# Patient Record
Sex: MALE | Race: BLACK OR AFRICAN AMERICAN | NOT HISPANIC OR LATINO | Employment: UNEMPLOYED | URBAN - METROPOLITAN AREA
[De-identification: names, ages, dates, MRNs, and addresses within clinical notes are randomized per-mention and may not be internally consistent; named-entity substitution may affect disease eponyms.]

---

## 2017-04-26 ENCOUNTER — HISTORICAL (OUTPATIENT)
Dept: ADMINISTRATIVE | Facility: HOSPITAL | Age: 32
End: 2017-04-26

## 2017-04-26 LAB
ALBUMIN SERPL-MCNC: 4.2 GM/DL (ref 3.4–5)
ALBUMIN/GLOB SERPL: 1 RATIO (ref 1–2)
ALP SERPL-CCNC: 31 UNIT/L (ref 20–120)
ALT SERPL-CCNC: 16 UNIT/L
AST SERPL-CCNC: 18 UNIT/L
BILIRUB SERPL-MCNC: 0.5 MG/DL
BILIRUBIN DIRECT+TOT PNL SERPL-MCNC: <0.1 MG/DL
BILIRUBIN DIRECT+TOT PNL SERPL-MCNC: >0.4 MG/DL
BUN SERPL-MCNC: 16 MG/DL (ref 7–25)
CALCIUM SERPL-MCNC: 9.5 MG/DL (ref 8.4–10.3)
CD3+CD4+ CELLS # SPEC: 714 UNIT/L (ref 589–1505)
CD3+CD4+ CELLS NFR BLD: 36 % (ref 31–59)
CHLORIDE SERPL-SCNC: 101 MMOL/L (ref 96–110)
CHOLEST SERPL-MCNC: 161 MG/DL
CHOLEST/HDLC SERPL: 2.6 {RATIO} (ref 0–5)
CO2 SERPL-SCNC: 29 MMOL/L (ref 24–32)
CREAT SERPL-MCNC: 0.85 MG/DL (ref 0.7–1.4)
DEPRECATED CALCIDIOL+CALCIFEROL SERPL-MC: 16.61 NG/ML (ref 30–80)
EST. AVERAGE GLUCOSE BLD GHB EST-MCNC: 108 MG/DL
GLOBULIN SER-MCNC: 3.5 GM/ML (ref 2.3–3.5)
GLUCOSE SERPL-MCNC: 96 MG/DL (ref 65–99)
HAV IGM SERPL QL IA: NONREACTIVE
HBA1C MFR BLD: 5.4 % (ref 4.7–5.6)
HBV CORE IGM SERPL QL IA: NONREACTIVE
HBV SURFACE AG SERPL QL IA: NEGATIVE
HCV AB SERPL QL IA: NONREACTIVE
HDLC SERPL-MCNC: 61 MG/DL
LDLC SERPL CALC-MCNC: 88 MG/DL (ref 0–130)
LYMPHOCYTES # BLD AUTO: 1872 UNIT/L (ref 1260–5520)
LYMPHOCYTES NFR LN MANUAL: 39 % (ref 28–48)
LYMPHOMA - T-CELL MARKERS SPEC-IMP: NORMAL
POTASSIUM SERPL-SCNC: 4 MMOL/L (ref 3.6–5.2)
PROT SERPL-MCNC: 7.7 GM/DL (ref 6–8)
RPR SER QL: NORMAL
SODIUM SERPL-SCNC: 138 MMOL/L (ref 135–146)
TRIGL SERPL-MCNC: 59 MG/DL
TSH SERPL-ACNC: 0.82 MIU/L (ref 0.5–5)
VLDLC SERPL CALC-MCNC: 12 MG/DL
WBC # BLD AUTO: 4800 /MM3 (ref 4500–11500)

## 2018-06-27 ENCOUNTER — HISTORICAL (OUTPATIENT)
Dept: ADMINISTRATIVE | Facility: HOSPITAL | Age: 33
End: 2018-06-27

## 2018-06-27 LAB
ABS NEUT (OLG): 3.95 X10(3)/MCL (ref 2.1–9.2)
ALBUMIN SERPL-MCNC: 4.3 GM/DL (ref 3.4–5)
ALBUMIN/GLOB SERPL: 1 RATIO (ref 1–2)
ALP SERPL-CCNC: 40 UNIT/L (ref 45–117)
ALT SERPL-CCNC: 22 UNIT/L (ref 12–78)
AST SERPL-CCNC: 11 UNIT/L (ref 15–37)
BASOPHILS # BLD AUTO: 0.04 X10(3)/MCL
BASOPHILS NFR BLD AUTO: 1 %
BILIRUB SERPL-MCNC: 0.2 MG/DL (ref 0.2–1)
BILIRUBIN DIRECT+TOT PNL SERPL-MCNC: <0.1 MG/DL
BILIRUBIN DIRECT+TOT PNL SERPL-MCNC: ABNORMAL MG/DL
BUN SERPL-MCNC: 17 MG/DL (ref 7–18)
CALCIUM SERPL-MCNC: 9.4 MG/DL (ref 8.5–10.1)
CD3+CD4+ CELLS # SPEC: 947 UNIT/L (ref 589–1505)
CD3+CD4+ CELLS NFR BLD: 34.6 % (ref 31–59)
CHLORIDE SERPL-SCNC: 102 MMOL/L (ref 98–107)
CHOLEST SERPL-MCNC: 156 MG/DL
CHOLEST/HDLC SERPL: 2.6 {RATIO} (ref 0–5)
CO2 SERPL-SCNC: 30 MMOL/L (ref 21–32)
CREAT SERPL-MCNC: 0.9 MG/DL (ref 0.6–1.3)
DEPRECATED CALCIDIOL+CALCIFEROL SERPL-MC: 9.75 NG/ML (ref 30–80)
EOSINOPHIL # BLD AUTO: 0.07 X10(3)/MCL
EOSINOPHIL NFR BLD AUTO: 1 %
ERYTHROCYTE [DISTWIDTH] IN BLOOD BY AUTOMATED COUNT: 12.9 % (ref 11.5–14.5)
GLOBULIN SER-MCNC: 4 GM/ML (ref 2.3–3.5)
GLUCOSE SERPL-MCNC: 87 MG/DL (ref 74–106)
HAV IGM SERPL QL IA: NONREACTIVE
HBV CORE IGM SERPL QL IA: NONREACTIVE
HBV SURFACE AG SERPL QL IA: NEGATIVE
HCT VFR BLD AUTO: 41.5 % (ref 40–51)
HCV AB SERPL QL IA: NONREACTIVE
HDLC SERPL-MCNC: 61 MG/DL
HGB BLD-MCNC: 13.9 GM/DL (ref 13.5–17.5)
IMM GRANULOCYTES # BLD AUTO: 0.01 10*3/UL
IMM GRANULOCYTES NFR BLD AUTO: 0 %
LDLC SERPL CALC-MCNC: 81 MG/DL (ref 0–130)
LYMPHOCYTES # BLD AUTO: 2.7 X10(3)/MCL
LYMPHOCYTES # BLD AUTO: 2736 UNIT/L (ref 1260–5520)
LYMPHOCYTES NFR BLD AUTO: 38 % (ref 13–40)
LYMPHOCYTES NFR LN MANUAL: 38 % (ref 28–48)
LYMPHOMA - T-CELL MARKERS SPEC-IMP: NORMAL
MCH RBC QN AUTO: 30.6 PG (ref 26–34)
MCHC RBC AUTO-ENTMCNC: 33.5 GM/DL (ref 31–37)
MCV RBC AUTO: 91.4 FL (ref 80–100)
MONOCYTES # BLD AUTO: 0.42 X10(3)/MCL
MONOCYTES NFR BLD AUTO: 6 % (ref 4–12)
NEG CONT SPOT COUNT: NORMAL
NEUTROPHILS # BLD AUTO: 3.95 X10(3)/MCL
NEUTROPHILS NFR BLD AUTO: 55 X10(3)/MCL
PANEL A SPOT COUNT: 0
PANEL B SPOT COUNT: 1
PLATELET # BLD AUTO: 261 X10(3)/MCL (ref 130–400)
PMV BLD AUTO: 10 FL (ref 7.4–10.4)
POS CONT SPOT COUNT: NORMAL
POTASSIUM SERPL-SCNC: 3.7 MMOL/L (ref 3.5–5.1)
PROT SERPL-MCNC: 8.3 GM/DL (ref 6.4–8.2)
RBC # BLD AUTO: 4.54 X10(6)/MCL (ref 4.5–5.9)
SODIUM SERPL-SCNC: 137 MMOL/L (ref 136–145)
T PALLIDUM AB SER QL: NONREACTIVE
T-SPOT.TB: NEGATIVE
TRIGL SERPL-MCNC: 69 MG/DL
VLDLC SERPL CALC-MCNC: 14 MG/DL
WBC # BLD AUTO: 7200 /MM3 (ref 4500–11500)
WBC # SPEC AUTO: 7.2 X10(3)/MCL (ref 4.5–11)

## 2019-03-27 ENCOUNTER — HISTORICAL (OUTPATIENT)
Dept: ADMINISTRATIVE | Facility: HOSPITAL | Age: 34
End: 2019-03-27

## 2019-03-27 LAB
ABS NEUT (OLG): 3.94 X10(3)/MCL (ref 2.1–9.2)
ALBUMIN SERPL-MCNC: 4.2 GM/DL (ref 3.4–5)
ALBUMIN/GLOB SERPL: 1 RATIO (ref 1.1–2)
ALP SERPL-CCNC: 48 UNIT/L (ref 45–117)
ALT SERPL-CCNC: 17 UNIT/L (ref 12–78)
APPEARANCE, UA: CLEAR
AST SERPL-CCNC: 10 UNIT/L (ref 15–37)
BACTERIA #/AREA URNS AUTO: ABNORMAL /[HPF]
BASOPHILS # BLD AUTO: 0.04 X10(3)/MCL
BASOPHILS NFR BLD AUTO: 1 %
BILIRUB SERPL-MCNC: 0.4 MG/DL (ref 0.2–1)
BILIRUB UR QL STRIP: NEGATIVE
BILIRUBIN DIRECT+TOT PNL SERPL-MCNC: 0.1 MG/DL
BILIRUBIN DIRECT+TOT PNL SERPL-MCNC: 0.3 MG/DL
BUN SERPL-MCNC: 19 MG/DL (ref 7–18)
CALCIUM SERPL-MCNC: 9.6 MG/DL (ref 8.5–10.1)
CD3+CD4+ CELLS # SPEC: 989 UNIT/L (ref 589–1505)
CD3+CD4+ CELLS NFR BLD: 39 % (ref 31–59)
CHLORIDE SERPL-SCNC: 102 MMOL/L (ref 98–107)
CO2 SERPL-SCNC: 32 MMOL/L (ref 21–32)
COLOR UR: YELLOW
CREAT SERPL-MCNC: 1 MG/DL (ref 0.6–1.3)
DEPRECATED CALCIDIOL+CALCIFEROL SERPL-MC: 11.35 NG/ML (ref 30–80)
EOSINOPHIL # BLD AUTO: 0.06 10*3/UL
EOSINOPHIL NFR BLD AUTO: 1 %
ERYTHROCYTE [DISTWIDTH] IN BLOOD BY AUTOMATED COUNT: 13.2 % (ref 11.5–14.5)
GLOBULIN SER-MCNC: 4.4 GM/ML (ref 2.3–3.5)
GLUCOSE (UA): NORMAL
GLUCOSE SERPL-MCNC: 86 MG/DL (ref 74–106)
HAV IGM SERPL QL IA: NONREACTIVE
HBV CORE IGM SERPL QL IA: NONREACTIVE
HBV SURFACE AG SERPL QL IA: NEGATIVE
HCT VFR BLD AUTO: 44.8 % (ref 40–51)
HCV AB SERPL QL IA: NONREACTIVE
HGB BLD-MCNC: 14.7 GM/DL (ref 13.5–17.5)
HGB UR QL STRIP: NEGATIVE
HYALINE CASTS #/AREA URNS LPF: ABNORMAL /[LPF]
IMM GRANULOCYTES # BLD AUTO: 0.01 10*3/UL
IMM GRANULOCYTES NFR BLD AUTO: 0 %
KETONES UR QL STRIP: NEGATIVE
LEUKOCYTE ESTERASE UR QL STRIP: NEGATIVE
LYMPHOCYTES # BLD AUTO: 2.74 X10(3)/MCL
LYMPHOCYTES # BLD AUTO: 2736 UNIT/L (ref 1260–5520)
LYMPHOCYTES NFR BLD AUTO: 38 % (ref 13–40)
LYMPHOCYTES NFR LN MANUAL: 38 % (ref 28–48)
LYMPHOMA - T-CELL MARKERS SPEC-IMP: NORMAL
MCH RBC QN AUTO: 30.3 PG (ref 26–34)
MCHC RBC AUTO-ENTMCNC: 32.8 GM/DL (ref 31–37)
MCV RBC AUTO: 92.4 FL (ref 80–100)
MONOCYTES # BLD AUTO: 0.42 X10(3)/MCL
MONOCYTES NFR BLD AUTO: 6 % (ref 4–12)
NEG CONT SPOT COUNT: NORMAL
NEUTROPHILS # BLD AUTO: 3.94 X10(3)/MCL
NEUTROPHILS NFR BLD AUTO: 55 X10(3)/MCL
NITRITE UR QL STRIP: NEGATIVE
PANEL A SPOT COUNT: 0
PANEL B SPOT COUNT: 0
PH UR STRIP: 7 [PH] (ref 4.5–8)
PLATELET # BLD AUTO: 283 X10(3)/MCL (ref 130–400)
PMV BLD AUTO: 9.9 FL (ref 7.4–10.4)
POS CONT SPOT COUNT: NORMAL
POTASSIUM SERPL-SCNC: 3.7 MMOL/L (ref 3.5–5.1)
PROT SERPL-MCNC: 8.6 GM/DL (ref 6.4–8.2)
PROT UR QL STRIP: 20 MG/DL
RBC # BLD AUTO: 4.85 X10(6)/MCL (ref 4.5–5.9)
RBC #/AREA URNS AUTO: ABNORMAL /[HPF]
SODIUM SERPL-SCNC: 137 MMOL/L (ref 136–145)
SP GR UR STRIP: 1.03 (ref 1–1.03)
SQUAMOUS #/AREA URNS LPF: ABNORMAL /[LPF]
T PALLIDUM AB SER QL: NONREACTIVE
T-SPOT.TB: NORMAL
TSH SERPL-ACNC: 1.49 MIU/L (ref 0.36–3.74)
UROBILINOGEN UR STRIP-ACNC: 2 MG/DL
WBC # BLD AUTO: 7200 /MM3 (ref 4500–11500)
WBC # SPEC AUTO: 7.2 X10(3)/MCL (ref 4.5–11)
WBC #/AREA URNS AUTO: ABNORMAL /HPF

## 2019-07-17 ENCOUNTER — HISTORICAL (OUTPATIENT)
Dept: ADMINISTRATIVE | Facility: HOSPITAL | Age: 34
End: 2019-07-17

## 2019-07-17 LAB
ABS NEUT (OLG): 2.93 X10(3)/MCL (ref 2.1–9.2)
ALBUMIN SERPL-MCNC: 3.6 GM/DL (ref 3.4–5)
ALBUMIN/GLOB SERPL: 0.9 RATIO (ref 1.1–2)
ALP SERPL-CCNC: 47 UNIT/L (ref 45–117)
ALT SERPL-CCNC: 42 UNIT/L (ref 12–78)
AST SERPL-CCNC: 19 UNIT/L (ref 15–37)
BASOPHILS # BLD AUTO: 0.05 X10(3)/MCL
BASOPHILS NFR BLD AUTO: 1 %
BILIRUB SERPL-MCNC: 0.3 MG/DL (ref 0.2–1)
BILIRUBIN DIRECT+TOT PNL SERPL-MCNC: <0.1 MG/DL
BILIRUBIN DIRECT+TOT PNL SERPL-MCNC: ABNORMAL MG/DL
BUN SERPL-MCNC: 16 MG/DL (ref 7–18)
CALCIUM SERPL-MCNC: 8.8 MG/DL (ref 8.5–10.1)
CD3+CD4+ CELLS # SPEC: 838 UNIT/L (ref 589–1505)
CD3+CD4+ CELLS NFR BLD: 24.3 % (ref 31–59)
CHLORIDE SERPL-SCNC: 108 MMOL/L (ref 98–107)
CHOLEST SERPL-MCNC: 134 MG/DL
CHOLEST/HDLC SERPL: 2.3 {RATIO} (ref 0–5)
CO2 SERPL-SCNC: 30 MMOL/L (ref 21–32)
CREAT SERPL-MCNC: 0.9 MG/DL (ref 0.6–1.3)
DEPRECATED CALCIDIOL+CALCIFEROL SERPL-MC: 20.81 NG/ML (ref 30–80)
EOSINOPHIL # BLD AUTO: 0.09 10*3/UL
EOSINOPHIL NFR BLD AUTO: 1 %
ERYTHROCYTE [DISTWIDTH] IN BLOOD BY AUTOMATED COUNT: 13.1 % (ref 11.5–14.5)
GLOBULIN SER-MCNC: 3.8 GM/ML (ref 2.3–3.5)
GLUCOSE SERPL-MCNC: 87 MG/DL (ref 74–106)
HCT VFR BLD AUTO: 40.8 % (ref 40–51)
HDLC SERPL-MCNC: 58 MG/DL
HGB BLD-MCNC: 13.5 GM/DL (ref 13.5–17.5)
IMM GRANULOCYTES # BLD AUTO: 0.03 10*3/UL
IMM GRANULOCYTES NFR BLD AUTO: 0 %
LDLC SERPL CALC-MCNC: 61 MG/DL (ref 0–130)
LYMPHOCYTES # BLD AUTO: 3.43 X10(3)/MCL
LYMPHOCYTES # BLD AUTO: 3450 UNIT/L (ref 1260–5520)
LYMPHOCYTES NFR BLD AUTO: 50 % (ref 13–40)
LYMPHOCYTES NFR LN MANUAL: 50 % (ref 28–48)
LYMPHOMA - T-CELL MARKERS SPEC-IMP: ABNORMAL
MCH RBC QN AUTO: 30 PG (ref 26–34)
MCHC RBC AUTO-ENTMCNC: 33.1 GM/DL (ref 31–37)
MCV RBC AUTO: 90.7 FL (ref 80–100)
MONOCYTES # BLD AUTO: 0.36 X10(3)/MCL
MONOCYTES NFR BLD AUTO: 5 % (ref 4–12)
NEUTROPHILS # BLD AUTO: 2.93 X10(3)/MCL
NEUTROPHILS NFR BLD AUTO: 43 X10(3)/MCL
PLATELET # BLD AUTO: 339 X10(3)/MCL (ref 130–400)
PMV BLD AUTO: 9.4 FL (ref 7.4–10.4)
POTASSIUM SERPL-SCNC: 3.6 MMOL/L (ref 3.5–5.1)
PROT SERPL-MCNC: 7.4 GM/DL (ref 6.4–8.2)
RBC # BLD AUTO: 4.5 X10(6)/MCL (ref 4.5–5.9)
SODIUM SERPL-SCNC: 141 MMOL/L (ref 136–145)
T PALLIDUM AB SER QL: NONREACTIVE
TRIGL SERPL-MCNC: 76 MG/DL
TSH SERPL-ACNC: 1.27 MIU/L (ref 0.36–3.74)
VLDLC SERPL CALC-MCNC: 15 MG/DL
WBC # BLD AUTO: 6900 /MM3 (ref 4500–11500)
WBC # SPEC AUTO: 6.9 X10(3)/MCL (ref 4.5–11)

## 2020-06-11 ENCOUNTER — HISTORICAL (OUTPATIENT)
Dept: ADMINISTRATIVE | Facility: HOSPITAL | Age: 35
End: 2020-06-11

## 2020-06-11 LAB
ABS NEUT (OLG): 3.43 X10(3)/MCL (ref 2.1–9.2)
ALBUMIN SERPL-MCNC: 4 GM/DL (ref 3.4–5)
ALBUMIN/GLOB SERPL: 0.9 RATIO (ref 1.1–2)
ALP SERPL-CCNC: 44 UNIT/L (ref 45–117)
ALT SERPL-CCNC: 21 UNIT/L (ref 12–78)
AST SERPL-CCNC: 11 UNIT/L (ref 15–37)
BASOPHILS # BLD AUTO: 0 X10(3)/MCL (ref 0–0.2)
BASOPHILS NFR BLD AUTO: 0 %
BILIRUB SERPL-MCNC: 0.4 MG/DL (ref 0.2–1)
BILIRUBIN DIRECT+TOT PNL SERPL-MCNC: 0.1 MG/DL (ref 0–0.2)
BILIRUBIN DIRECT+TOT PNL SERPL-MCNC: 0.3 MG/DL
BUN SERPL-MCNC: 18 MG/DL (ref 7–18)
CALCIUM SERPL-MCNC: 9.5 MG/DL (ref 8.5–10.1)
CHLORIDE SERPL-SCNC: 105 MMOL/L (ref 98–107)
CHOLEST SERPL-MCNC: 188 MG/DL
CHOLEST/HDLC SERPL: 2.8 {RATIO} (ref 0–5)
CO2 SERPL-SCNC: 29 MMOL/L (ref 21–32)
CREAT SERPL-MCNC: 1 MG/DL (ref 0.6–1.3)
DEPRECATED CALCIDIOL+CALCIFEROL SERPL-MC: 16 NG/ML (ref 30–80)
EOSINOPHIL # BLD AUTO: 0 X10(3)/MCL (ref 0–0.9)
EOSINOPHIL NFR BLD AUTO: 1 %
ERYTHROCYTE [DISTWIDTH] IN BLOOD BY AUTOMATED COUNT: 13.4 % (ref 11.5–14.5)
GLOBULIN SER-MCNC: 4.4 GM/ML (ref 2.3–3.5)
GLUCOSE SERPL-MCNC: 89 MG/DL (ref 74–106)
HCT VFR BLD AUTO: 42.3 % (ref 40–51)
HDLC SERPL-MCNC: 67 MG/DL (ref 40–59)
HGB BLD-MCNC: 13.9 GM/DL (ref 13.5–17.5)
HIV 1+2 AB+HIV1 P24 AG SERPL QL IA: REACTIVE
IMM GRANULOCYTES # BLD AUTO: 0.01 10*3/UL
IMM GRANULOCYTES NFR BLD AUTO: 0 %
LDLC SERPL CALC-MCNC: 102 MG/DL
LYMPHOCYTES # BLD AUTO: 2.6 X10(3)/MCL (ref 0.6–4.6)
LYMPHOCYTES NFR BLD AUTO: 40 %
MCH RBC QN AUTO: 30.2 PG (ref 26–34)
MCHC RBC AUTO-ENTMCNC: 32.9 GM/DL (ref 31–37)
MCV RBC AUTO: 92 FL (ref 80–100)
MONOCYTES # BLD AUTO: 0.4 X10(3)/MCL (ref 0.1–1.3)
MONOCYTES NFR BLD AUTO: 5 %
NEG CONT SPOT COUNT: NORMAL
NEUTROPHILS # BLD AUTO: 3.43 X10(3)/MCL (ref 2.1–9.2)
NEUTROPHILS NFR BLD AUTO: 53 %
PANEL A SPOT COUNT: 0
PANEL B SPOT COUNT: 0
PLATELET # BLD AUTO: 285 X10(3)/MCL (ref 130–400)
PMV BLD AUTO: 9.7 FL (ref 7.4–10.4)
POS CONT SPOT COUNT: NORMAL
POTASSIUM SERPL-SCNC: 3.6 MMOL/L (ref 3.5–5.1)
PROT SERPL-MCNC: 8.4 GM/DL (ref 6.4–8.2)
RBC # BLD AUTO: 4.6 X10(6)/MCL (ref 4.5–5.9)
SODIUM SERPL-SCNC: 139 MMOL/L (ref 136–145)
T-SPOT.TB: NORMAL
T4 FREE SERPL-MCNC: 0.91 NG/DL (ref 0.76–1.46)
TRIGL SERPL-MCNC: 96 MG/DL
TSH SERPL-ACNC: 1.03 MIU/L (ref 0.36–3.74)
VLDLC SERPL CALC-MCNC: 19 MG/DL
WBC # SPEC AUTO: 6.4 X10(3)/MCL (ref 4.5–11)

## 2021-03-04 ENCOUNTER — HISTORICAL (OUTPATIENT)
Dept: ADMINISTRATIVE | Facility: HOSPITAL | Age: 36
End: 2021-03-04

## 2021-03-04 LAB
ABS NEUT (OLG): 4.01 X10(3)/MCL (ref 2.1–9.2)
ALBUMIN SERPL-MCNC: 4.4 GM/DL (ref 3.5–5)
ALBUMIN/GLOB SERPL: 1.3 RATIO (ref 1.1–2)
ALP SERPL-CCNC: 54 UNIT/L (ref 40–150)
ALT SERPL-CCNC: 15 UNIT/L (ref 0–55)
APPEARANCE, UA: CLEAR
AST SERPL-CCNC: 13 UNIT/L (ref 5–34)
BACTERIA #/AREA URNS AUTO: ABNORMAL /HPF
BASOPHILS # BLD AUTO: 0 X10(3)/MCL (ref 0–0.2)
BASOPHILS NFR BLD AUTO: 1 %
BILIRUB SERPL-MCNC: 0.5 MG/DL
BILIRUB UR QL STRIP: NEGATIVE
BILIRUBIN DIRECT+TOT PNL SERPL-MCNC: 0.2 MG/DL (ref 0–0.5)
BILIRUBIN DIRECT+TOT PNL SERPL-MCNC: 0.3 MG/DL (ref 0–0.8)
BUN SERPL-MCNC: 14.9 MG/DL (ref 8.9–20.6)
CALCIUM SERPL-MCNC: 9.5 MG/DL (ref 8.4–10.2)
CD3+CD4+ CELLS # SPEC: 847 UNIT/L (ref 589–1505)
CD3+CD4+ CELLS NFR BLD: 35.6 % (ref 31–59)
CHLORIDE SERPL-SCNC: 101 MMOL/L (ref 98–107)
CHOLEST SERPL-MCNC: 170 MG/DL
CHOLEST/HDLC SERPL: 3 {RATIO} (ref 0–5)
CO2 SERPL-SCNC: 29 MMOL/L (ref 22–29)
COLOR UR: ABNORMAL
CREAT SERPL-MCNC: 0.99 MG/DL (ref 0.73–1.18)
DEPRECATED CALCIDIOL+CALCIFEROL SERPL-MC: 20.6 NG/ML (ref 30–80)
EOSINOPHIL # BLD AUTO: 0 X10(3)/MCL (ref 0–0.9)
EOSINOPHIL NFR BLD AUTO: 0 %
ERYTHROCYTE [DISTWIDTH] IN BLOOD BY AUTOMATED COUNT: 13.2 % (ref 11.5–14.5)
GLOBULIN SER-MCNC: 3.3 GM/DL (ref 2.4–3.5)
GLUCOSE (UA): NEGATIVE
GLUCOSE SERPL-MCNC: 91 MG/DL (ref 74–100)
HAV IGM SERPL QL IA: NONREACTIVE
HBV CORE IGM SERPL QL IA: NONREACTIVE
HBV SURFACE AG SERPL QL IA: NONREACTIVE
HCT VFR BLD AUTO: 42.1 % (ref 40–51)
HCV AB SERPL QL IA: NONREACTIVE
HDLC SERPL-MCNC: 59 MG/DL (ref 35–60)
HGB BLD-MCNC: 13.9 GM/DL (ref 13.5–17.5)
HGB UR QL STRIP: 0.03 MG/DL
HYALINE CASTS #/AREA URNS LPF: ABNORMAL /LPF
IMM GRANULOCYTES # BLD AUTO: 0.01 10*3/UL
IMM GRANULOCYTES NFR BLD AUTO: 0 %
KETONES UR QL STRIP: NEGATIVE
LDLC SERPL CALC-MCNC: 94 MG/DL (ref 50–140)
LEUKOCYTE ESTERASE UR QL STRIP: 25 LEU/UL
LYMPHOCYTES # BLD AUTO: 2.4 X10(3)/MCL (ref 0.6–4.6)
LYMPHOCYTES # BLD AUTO: 2380 UNIT/L (ref 1260–5520)
LYMPHOCYTES NFR BLD AUTO: 35 %
LYMPHOCYTES NFR LN MANUAL: 35 % (ref 28–48)
LYMPHOMA - T-CELL MARKERS SPEC-IMP: NORMAL
MCH RBC QN AUTO: 30.5 PG (ref 26–34)
MCHC RBC AUTO-ENTMCNC: 33 GM/DL (ref 31–37)
MCV RBC AUTO: 92.3 FL (ref 80–100)
MONOCYTES # BLD AUTO: 0.4 X10(3)/MCL (ref 0.1–1.3)
MONOCYTES NFR BLD AUTO: 6 %
NEG CONT SPOT COUNT: NORMAL
NEUTROPHILS # BLD AUTO: 4.01 X10(3)/MCL (ref 2.1–9.2)
NEUTROPHILS NFR BLD AUTO: 59 %
NITRITE UR QL STRIP: NEGATIVE
PANEL A SPOT COUNT: 0
PANEL B SPOT COUNT: 0
PH UR STRIP: 6.5 [PH] (ref 4.5–8)
PLATELET # BLD AUTO: 274 X10(3)/MCL (ref 130–400)
PMV BLD AUTO: 10.1 FL (ref 7.4–10.4)
POS CONT SPOT COUNT: NORMAL
POTASSIUM SERPL-SCNC: 4 MMOL/L (ref 3.5–5.1)
PROT SERPL-MCNC: 7.7 GM/DL (ref 6.4–8.3)
PROT UR QL STRIP: 10 MG/DL
RBC # BLD AUTO: 4.56 X10(6)/MCL (ref 4.5–5.9)
RBC #/AREA URNS AUTO: ABNORMAL /HPF
SODIUM SERPL-SCNC: 139 MMOL/L (ref 136–145)
SP GR UR STRIP: 1.02 (ref 1–1.03)
SQUAMOUS #/AREA URNS LPF: ABNORMAL /LPF
T PALLIDUM AB SER QL: NONREACTIVE
T-SPOT.TB: NORMAL
TRIGL SERPL-MCNC: 85 MG/DL (ref 34–140)
UROBILINOGEN UR STRIP-ACNC: NORMAL
VLDLC SERPL CALC-MCNC: 17 MG/DL
WBC # BLD AUTO: 6800 /MM3 (ref 4500–11500)
WBC # SPEC AUTO: 6.8 X10(3)/MCL (ref 4.5–11)
WBC #/AREA URNS AUTO: ABNORMAL /HPF

## 2022-01-20 ENCOUNTER — HISTORICAL (OUTPATIENT)
Dept: ADMINISTRATIVE | Facility: HOSPITAL | Age: 37
End: 2022-01-20

## 2022-01-20 LAB
APPEARANCE, UA: CLEAR
BACTERIA SPEC CULT: ABNORMAL
BILIRUB UR QL STRIP: NEGATIVE
COLOR UR: ABNORMAL
GLUCOSE (UA): NORMAL /UL
HGB UR QL STRIP: NEGATIVE /HPF
HYALINE CASTS #/AREA URNS LPF: ABNORMAL /LPF
KETONES UR QL STRIP: NEGATIVE /UL
LEUKOCYTE ESTERASE UR QL STRIP: NEGATIVE
LYMPHOMA - T-CELL MARKERS SPEC-IMP: NORMAL
MUCOUS THREADS URNS QL MICRO: ABNORMAL /LPF
NITRITE UR QL STRIP: NEGATIVE
PH UR STRIP: 8 /UL (ref 4.5–8)
PROT UR QL STRIP: NEGATIVE /UL
RBC #/AREA URNS HPF: ABNORMAL /HPF
SP GR UR STRIP: 1.02 (ref 1–1.03)
SQUAMOUS EPITHELIAL, UA: ABNORMAL /HPF
UROBILINOGEN UR STRIP-ACNC: NORMAL /HPF
WBC #/AREA URNS HPF: ABNORMAL /HPF

## 2022-04-11 ENCOUNTER — HISTORICAL (OUTPATIENT)
Dept: ADMINISTRATIVE | Facility: HOSPITAL | Age: 37
End: 2022-04-11
Payer: MEDICAID

## 2022-04-28 VITALS
SYSTOLIC BLOOD PRESSURE: 108 MMHG | WEIGHT: 208.31 LBS | BODY MASS INDEX: 31.57 KG/M2 | OXYGEN SATURATION: 93 % | HEIGHT: 68 IN | DIASTOLIC BLOOD PRESSURE: 78 MMHG

## 2022-05-09 RX ORDER — BICTEGRAVIR SODIUM, EMTRICITABINE, AND TENOFOVIR ALAFENAMIDE FUMARATE 50; 200; 25 MG/1; MG/1; MG/1
TABLET ORAL
COMMUNITY
Start: 2021-06-03 | End: 2022-07-01 | Stop reason: SDUPTHER

## 2022-05-09 RX ORDER — CHOLECALCIFEROL (VITAMIN D3) 1250 MCG
50000 TABLET ORAL
COMMUNITY
Start: 2021-06-03 | End: 2022-11-15 | Stop reason: ALTCHOICE

## 2022-05-11 ENCOUNTER — OFFICE VISIT (OUTPATIENT)
Dept: INFECTIOUS DISEASES | Facility: CLINIC | Age: 37
End: 2022-05-11
Payer: MEDICAID

## 2022-05-11 VITALS
RESPIRATION RATE: 18 BRPM | SYSTOLIC BLOOD PRESSURE: 115 MMHG | HEART RATE: 76 BPM | DIASTOLIC BLOOD PRESSURE: 76 MMHG | TEMPERATURE: 98 F | BODY MASS INDEX: 32.37 KG/M2 | HEIGHT: 69 IN | WEIGHT: 218.56 LBS

## 2022-05-11 DIAGNOSIS — Z21 HIV POSITIVE: Primary | ICD-10-CM

## 2022-05-11 PROCEDURE — 3074F SYST BP LT 130 MM HG: CPT | Mod: CPTII,,, | Performed by: INTERNAL MEDICINE

## 2022-05-11 PROCEDURE — 1159F MED LIST DOCD IN RCRD: CPT | Mod: CPTII,,, | Performed by: INTERNAL MEDICINE

## 2022-05-11 PROCEDURE — 1159F PR MEDICATION LIST DOCUMENTED IN MEDICAL RECORD: ICD-10-PCS | Mod: CPTII,,, | Performed by: INTERNAL MEDICINE

## 2022-05-11 PROCEDURE — 3078F DIAST BP <80 MM HG: CPT | Mod: CPTII,,, | Performed by: INTERNAL MEDICINE

## 2022-05-11 PROCEDURE — 3008F BODY MASS INDEX DOCD: CPT | Mod: CPTII,,, | Performed by: INTERNAL MEDICINE

## 2022-05-11 PROCEDURE — 99213 OFFICE O/P EST LOW 20 MIN: CPT | Mod: PBBFAC | Performed by: INTERNAL MEDICINE

## 2022-05-11 PROCEDURE — 99214 OFFICE O/P EST MOD 30 MIN: CPT | Mod: S$PBB,,, | Performed by: INTERNAL MEDICINE

## 2022-05-11 PROCEDURE — 3078F PR MOST RECENT DIASTOLIC BLOOD PRESSURE < 80 MM HG: ICD-10-PCS | Mod: CPTII,,, | Performed by: INTERNAL MEDICINE

## 2022-05-11 PROCEDURE — 99214 PR OFFICE/OUTPT VISIT, EST, LEVL IV, 30-39 MIN: ICD-10-PCS | Mod: S$PBB,,, | Performed by: INTERNAL MEDICINE

## 2022-05-11 PROCEDURE — 3008F PR BODY MASS INDEX (BMI) DOCUMENTED: ICD-10-PCS | Mod: CPTII,,, | Performed by: INTERNAL MEDICINE

## 2022-05-11 PROCEDURE — 3074F PR MOST RECENT SYSTOLIC BLOOD PRESSURE < 130 MM HG: ICD-10-PCS | Mod: CPTII,,, | Performed by: INTERNAL MEDICINE

## 2022-05-11 NOTE — PROGRESS NOTES
"[facility] ID Office Visit Note     Chief Complaint     HIV Positive/AIDS and Follow-up       Subjective:      History of Present Illness: Patient is a 36 year old  male with history of HIV, depression, obesity who presents to clinic for follow up visit.    Today, patient reports no complaints aside from knee pain from heavy lifting. Denies fever, chills or any acute complaints. Patient states he has gained 10 pounds in the past 4 months. Has been compliant with Biktarvy. States he currently works as an . Reports alcohol use but socially only. Denies tobacco or drug use. States he is sexually active but uses barrier protection.    Home Medications:  Prior to Admission medications    Medication Sig Start Date End Date Taking? Authorizing Provider   zsgmfdfzn-wtokzbkc-shienoa ala (BIKTARVY) -25 mg (25 kg or greater) Take by mouth. 6/3/21  Yes Historical Provider   cholecalciferol, vitamin D3, 1,250 mcg (50,000 unit) Tab Take 50,000 Int'l Units by mouth. 6/3/21  Yes Historical Provider       Review of Systems: Full 14 point ROS performed, all negative except as in HPI     Objective:   Last 24 Hour Vital Signs:  Vitals:    05/11/22 1408   BP: 115/76   Pulse: 76   Resp: 18   Temp: 98.1 °F (36.7 °C)   Weight: 99.2 kg (218 lb 9.4 oz)   Height: 5' 9" (1.753 m)         Physical Examination:  Gen: in NAD, appears stated age, obese, well groomed  HEENT: AT, NC, oropharynx wnl  Heart: S1/S2 heard, RRR, no murmurs, no peripheral edema  Lungs: CTABL, symmetric expansion, no W/C/R heard  GI: soft, NT, ND, BS +  EXT: normal perfusion  MSK: no deformities noted  Skin: intact, dry, no rash  Neuro: AOx3, no focal motor/sensory deficits     Assessment & Plan:     HIV       - Last CD4 count > 1000 in 01/22, VL < 20, will repeat today       - Compliant with Biktarvy, continue       - Counseled regarding using protection during sexual intercourse       - Return to clinic in 6 months      Lupe Navarro MD  [employer] " Internal Medicine [facility]    ATTENDING ADDENDUM:    Patient seen and examined with resident, care provided was reasonable and appropriate.  I have reviewed the treatment plan and edited as appropriate.  All patient questions were answered.     MD Rose Mary   Infectious Diseases

## 2022-05-21 NOTE — HISTORICAL [facility] CERNER
This is a historical note converted from [employer]. Formatting and pictures may have been removed.  Please reference [employer] for original formatting and attached multimedia. Chief Complaint  B20 f/u visit  History of Present Illness  Patient is a?36-year-old?-American man who presents for routine follow-up for his HIV.? He is on Biktarvy, which he has been on for several years, and reports?100%?compliance?and no issues with the medication.? Last CD4?was 847, CD4%?35.6%, viral load 30 on 3/4/2021. ?Patient today?states he is feeling very well,?has had no health concerns since his last visit.? He denies any fevers, chills,?nausea, vomiting, diarrhea, chest pain, shortness of breath, headache,?vision changes, new rashes, or joint pains.? Patient reports that he has had 2?COVID vaccinations (pfizer) and is scheduled to have his booster next Tuesday.  Review of Systems  Gen: No fever, chills, or weight changes  Eye: No blindness or vision changes  Heart:?No chest pain, palpitations, or diaphoresis  Lungs:?No shortness of breath, cough, or wheezing  GI: No abdominal pain, nausea, vomiting, or diarrhea  : No hematuria, No dysuria  Musk: No lower extremity swelling  Integumentary: No rash or itching  Neuro: Normal speech, no focal weakness or headache  Physical Exam  Vitals & Measurements  T:?37.3? ?C (Oral)? HR:?66(Peripheral)? RR:?20? BP:?108/78?  HT:?172.00?cm? WT:?94.500?kg? BMI:?31.94?  General: Well-appearing, well-nourished, no acute distress  Eye: PERRL, EOMI  HENT: Normocephalic, atraumatic, moist mucous membranes  Neck: Supple, nontender  Respiratory:?Clear to auscultation bilaterally, no wheezes, rales, or rhonchi  Cardiovascular:?Regular rate and rhythm, no murmurs, rubs, or gallops. No peripheral edema  Gastrointestinal: Soft, nontender, nondistended  Musculoskeletal: Moves all extremities purposefully  Integumentary: Warm, dry, no rashes  Neurologic:?Alert and oriented x3. Normal speech. No gross  deficits  Psychiatric:?Appropriate mood and affect  Assessment/Plan  HIV Disease  -Last CD4?847, last CD4% 35.6, last viral load 30, all on?3/4/2021  -?Continue Biktarvy  -?Labs ordered today:?CBC, CMP,?HIV viral load, CD4,?hepatitis panel,?hepatitis B surface antibody?quantitative,?urinalysis,?urine GC/CT?NAAT  -Getting Pneumovax 23 today as it has been >?5 years?since he last had it. ?Also getting flu vaccine?today  -?Reports he is scheduled to get his?COVID booster?next week  ?  ID ATTENDING ADDENDUM  Patient seen and examined with resident. ?Agree with documented physical exam. ?Treatment plan reviewed and discussed with resident and is reasonable and appropriate.???  Referrals  Clinic Follow up, *Est. 05/20/22 3:00:00 CDT, Order for future visit, HIV disease  Need for vaccination, [facility]   Problem List/Past Medical History  Ongoing  HIV disease  Obesity  Historical  No qualifying data  Procedure/Surgical History  none   Medications  Biktarvy oral tablet, 1 tab(s), Oral, Daily, 5 refills  Vitamin D 50,000 intl units oral capsule, 49179 IntUnit= 1 cap(s), Oral, qWeek, 5 refills  Allergies  No Known Medication Allergies  Social History  Abuse/Neglect  No, No, Yes, 01/20/2022  Alcohol  Current, Wine, Liquor, Daily, Alcohol use interferes with work or home: No. Others hurt by drinking: No. 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 Exercise frequency: 3-4 times/week. Exercise type: Walking, Running., 07/17/2019  Nutrition/Health  Regular, Good, 11/19/2019  Sexual  Sexually active: Yes. Sexually active at age 14 Years. Number of current partners 1. Number of lifetime partners 40. Sexual orientation: Bisexual. Uses condoms: Yes. History of sexual abuse: No. Gender Identity Identifies as male., 03/27/2019  Spiritual/Cultural  Restorationist, 11/19/2019  Substance Use  Never,  03/27/2019  Tobacco  Never (less than 100 in lifetime), N/A, 01/20/2022  Family History  Diabetes mellitus type 2: Mother.  Hypertension.: Mother.  Immunizations  Vaccine Date Status Comments   COVID-19 MRNA, LNP-S, PF- Pfizer 03/31/2021 Recorded    COVID-19 MRNA, LNP-S, PF- Pfizer 03/10/2021 Recorded    tetanus/diphtheria/pertussis, acel(Tdap) 06/11/2020 Given    influenza virus vaccine, inactivated 11/19/2019 Given    meningococcal conjugate vaccine 07/17/2019 Given    hepatitis A-hepatitis B vaccine 07/17/2019 Given    hepatitis A-hepatitis B vaccine 03/27/2019 Given    meningococcal conjugate vaccine 03/27/2019 Given    influenza virus vaccine, inactivated - Not Given Patient Refuses  Out of stock   influenza virus vaccine, inactivated 09/28/2016 Given MFG SEQIRUS   influenza virus vaccine, inactivated 10/09/2015 Recorded    pneumococcal 23-polyvalent vaccine 07/16/2015 Recorded    pneumococcal 13-valent conjugate vaccine 02/20/2015 Recorded    meningococcal conjugate vaccine 01/23/2008 Recorded    varicella virus vaccine 05/17/2005 Recorded    hepatitis B pediatric vaccine 05/17/2005 Recorded    varicella virus vaccine 09/13/2004 Recorded    hepatitis B pediatric vaccine 09/13/2004 Recorded    hepatitis B pediatric vaccine 12/11/1997 Recorded    hepatitis B pediatric vaccine 11/06/1997 Recorded    poliovirus vaccine, live, trivalent 09/01/1992 Recorded    measles/mumps/rubella virus vaccine 09/01/1992 Recorded    poliovirus vaccine, live, trivalent 02/13/1989 Recorded    measles/mumps/rubella virus vaccine 02/13/1989 Recorded    poliovirus vaccine, live, trivalent 02/01/1988 Recorded    poliovirus vaccine, live, trivalent 1987 Recorded    Health Maintenance  Health Maintenance  ???Pending?(in the next year)  ??? ??OverDue  ??? ? ? ?Influenza Vaccine due??10/01/21??and every 1??day(s)  ??? ??Due?  ??? ? ? ?Alcohol Misuse Screening due??01/02/22??and every 1??year(s)  ??? ??Due In Future?  ??? ? ? ?ADL  Screening not due until??03/04/22??and every 1??year(s)  ??? ? ? ?Obesity Screening not due until??01/01/23??and every 1??year(s)  ???Satisfied?(in the past 1 year)  ??? ??Satisfied?  ??? ? ? ?ADL Screening on??03/04/21.??Satisfied by Hilary Adams LPN  ??? ? ? ?Blood Pressure Screening on??01/20/22.??Satisfied by Jocelin Moreno LPN  ??? ? ? ?Body Mass Index Check on??01/20/22.??Satisfied by Jocelin Moreno LPN  ??? ? ? ?Depression Screening on??01/20/22.??Satisfied by Jocelin Moreno LPN  ??? ? ? ?Diabetes Screening on??03/04/21.??Satisfied by Helena Waters  ??? ? ? ?Influenza Vaccine on??01/20/22.??Satisfied by Jocelin Moreno LPN  ??? ? ? ?Obesity Screening on??01/20/22.??Satisfied by Jocelin Moreno LPN  ?

## 2022-07-01 DIAGNOSIS — Z21 HIV POSITIVE: Primary | ICD-10-CM

## 2022-07-01 RX ORDER — BICTEGRAVIR SODIUM, EMTRICITABINE, AND TENOFOVIR ALAFENAMIDE FUMARATE 50; 200; 25 MG/1; MG/1; MG/1
1 TABLET ORAL DAILY
Qty: 30 TABLET | Refills: 4 | Status: SHIPPED | OUTPATIENT
Start: 2022-07-01 | End: 2022-07-31

## 2022-11-15 ENCOUNTER — OFFICE VISIT (OUTPATIENT)
Dept: INFECTIOUS DISEASES | Facility: CLINIC | Age: 37
End: 2022-11-15
Payer: MEDICAID

## 2022-11-15 VITALS
DIASTOLIC BLOOD PRESSURE: 86 MMHG | BODY MASS INDEX: 24.44 KG/M2 | SYSTOLIC BLOOD PRESSURE: 139 MMHG | WEIGHT: 165 LBS | OXYGEN SATURATION: 99 % | HEIGHT: 69 IN | TEMPERATURE: 98 F | RESPIRATION RATE: 18 BRPM | HEART RATE: 81 BPM

## 2022-11-15 DIAGNOSIS — Z23 NEED FOR VACCINATION: ICD-10-CM

## 2022-11-15 DIAGNOSIS — E55.9 VITAMIN D DEFICIENCY: ICD-10-CM

## 2022-11-15 DIAGNOSIS — B20 HIV DISEASE: Primary | ICD-10-CM

## 2022-11-15 DIAGNOSIS — Z78.9 ALCOHOL USE: ICD-10-CM

## 2022-11-15 PROBLEM — F10.90 ALCOHOL USE: Status: ACTIVE | Noted: 2022-11-15

## 2022-11-15 LAB
ALBUMIN SERPL-MCNC: 4.1 GM/DL (ref 3.5–5)
ALBUMIN/GLOB SERPL: 1.2 RATIO (ref 1.1–2)
ALP SERPL-CCNC: 41 UNIT/L (ref 40–150)
ALT SERPL-CCNC: 11 UNIT/L (ref 0–55)
APPEARANCE UR: CLEAR
AST SERPL-CCNC: 13 UNIT/L (ref 5–34)
BACTERIA #/AREA URNS AUTO: ABNORMAL /HPF
BASOPHILS # BLD AUTO: 0.03 X10(3)/MCL (ref 0–0.2)
BASOPHILS NFR BLD AUTO: 0.6 %
BILIRUB UR QL STRIP.AUTO: NEGATIVE MG/DL
BILIRUBIN DIRECT+TOT PNL SERPL-MCNC: 0.5 MG/DL
BUN SERPL-MCNC: 17.8 MG/DL (ref 8.9–20.6)
CALCIUM SERPL-MCNC: 9.9 MG/DL (ref 8.4–10.2)
CHLORIDE SERPL-SCNC: 102 MMOL/L (ref 98–107)
CO2 SERPL-SCNC: 30 MMOL/L (ref 22–29)
COLOR UR AUTO: ABNORMAL
CREAT SERPL-MCNC: 1.05 MG/DL (ref 0.73–1.18)
DEPRECATED CALCIDIOL+CALCIFEROL SERPL-MC: 18.2 NG/ML (ref 30–80)
EOSINOPHIL # BLD AUTO: 0.02 X10(3)/MCL (ref 0–0.9)
EOSINOPHIL NFR BLD AUTO: 0.4 %
ERYTHROCYTE [DISTWIDTH] IN BLOOD BY AUTOMATED COUNT: 13.2 % (ref 11.5–17)
GFR SERPLBLD CREATININE-BSD FMLA CKD-EPI: >60 MLS/MIN/1.73/M2
GLOBULIN SER-MCNC: 3.5 GM/DL (ref 2.4–3.5)
GLUCOSE SERPL-MCNC: 98 MG/DL (ref 74–100)
GLUCOSE UR QL STRIP.AUTO: NORMAL MG/DL
HAV AB SER QL IA: REACTIVE
HBV SURFACE AB SER-ACNC: ABNORMAL MIU/ML
HBV SURFACE AB SERPL IA-ACNC: REACTIVE M[IU]/ML
HCT VFR BLD AUTO: 43.2 % (ref 42–52)
HGB BLD-MCNC: 14.1 GM/DL (ref 14–18)
HYALINE CASTS #/AREA URNS LPF: ABNORMAL /LPF
IMM GRANULOCYTES # BLD AUTO: 0.02 X10(3)/MCL (ref 0–0.04)
IMM GRANULOCYTES NFR BLD AUTO: 0.4 %
KETONES UR QL STRIP.AUTO: NEGATIVE MG/DL
LEUKOCYTE ESTERASE UR QL STRIP.AUTO: NEGATIVE UNIT/L
LYMPHOCYTES # BLD AUTO: 2.23 X10(3)/MCL (ref 0.6–4.6)
LYMPHOCYTES NFR BLD AUTO: 41.1 %
MCH RBC QN AUTO: 30.1 PG (ref 27–31)
MCHC RBC AUTO-ENTMCNC: 32.6 MG/DL (ref 33–36)
MCV RBC AUTO: 92.1 FL (ref 80–94)
MONOCYTES # BLD AUTO: 0.26 X10(3)/MCL (ref 0.1–1.3)
MONOCYTES NFR BLD AUTO: 4.8 %
MUCOUS THREADS URNS QL MICRO: ABNORMAL /LPF
NEUTROPHILS # BLD AUTO: 2.9 X10(3)/MCL (ref 2.1–9.2)
NEUTROPHILS NFR BLD AUTO: 52.7 %
NITRITE UR QL STRIP.AUTO: NEGATIVE
NRBC BLD AUTO-RTO: 0 %
PH UR STRIP.AUTO: 6 [PH]
PLATELET # BLD AUTO: 290 X10(3)/MCL (ref 130–400)
PMV BLD AUTO: 10.4 FL (ref 7.4–10.4)
POTASSIUM SERPL-SCNC: 3.7 MMOL/L (ref 3.5–5.1)
PROT SERPL-MCNC: 7.6 GM/DL (ref 6.4–8.3)
PROT UR QL STRIP.AUTO: NEGATIVE MG/DL
RBC # BLD AUTO: 4.69 X10(6)/MCL (ref 4.7–6.1)
RBC #/AREA URNS AUTO: ABNORMAL /HPF
RBC UR QL AUTO: ABNORMAL UNIT/L
SODIUM SERPL-SCNC: 140 MMOL/L (ref 136–145)
SP GR UR STRIP.AUTO: 1.02
SQUAMOUS #/AREA URNS LPF: ABNORMAL /HPF
UROBILINOGEN UR STRIP-ACNC: NORMAL MG/DL
WBC # SPEC AUTO: 5.4 X10(3)/MCL (ref 4.5–11.5)
WBC #/AREA URNS AUTO: ABNORMAL /HPF

## 2022-11-15 PROCEDURE — 3079F DIAST BP 80-89 MM HG: CPT | Mod: CPTII,,, | Performed by: NURSE PRACTITIONER

## 2022-11-15 PROCEDURE — 86706 HEP B SURFACE ANTIBODY: CPT | Performed by: NURSE PRACTITIONER

## 2022-11-15 PROCEDURE — 1159F MED LIST DOCD IN RCRD: CPT | Mod: CPTII,,, | Performed by: NURSE PRACTITIONER

## 2022-11-15 PROCEDURE — 3075F SYST BP GE 130 - 139MM HG: CPT | Mod: CPTII,,, | Performed by: NURSE PRACTITIONER

## 2022-11-15 PROCEDURE — 99214 OFFICE O/P EST MOD 30 MIN: CPT | Mod: PBBFAC | Performed by: NURSE PRACTITIONER

## 2022-11-15 PROCEDURE — 1160F PR REVIEW ALL MEDS BY PRESCRIBER/CLIN PHARMACIST DOCUMENTED: ICD-10-PCS | Mod: CPTII,,, | Performed by: NURSE PRACTITIONER

## 2022-11-15 PROCEDURE — 82306 VITAMIN D 25 HYDROXY: CPT | Performed by: NURSE PRACTITIONER

## 2022-11-15 PROCEDURE — 87536 HIV-1 QUANT&REVRSE TRNSCRPJ: CPT | Performed by: NURSE PRACTITIONER

## 2022-11-15 PROCEDURE — 3008F BODY MASS INDEX DOCD: CPT | Mod: CPTII,,, | Performed by: NURSE PRACTITIONER

## 2022-11-15 PROCEDURE — 99215 PR OFFICE/OUTPT VISIT, EST, LEVL V, 40-54 MIN: ICD-10-PCS | Mod: S$PBB,,, | Performed by: NURSE PRACTITIONER

## 2022-11-15 PROCEDURE — 81001 URINALYSIS AUTO W/SCOPE: CPT | Performed by: NURSE PRACTITIONER

## 2022-11-15 PROCEDURE — 3075F PR MOST RECENT SYSTOLIC BLOOD PRESS GE 130-139MM HG: ICD-10-PCS | Mod: CPTII,,, | Performed by: NURSE PRACTITIONER

## 2022-11-15 PROCEDURE — 3008F PR BODY MASS INDEX (BMI) DOCUMENTED: ICD-10-PCS | Mod: CPTII,,, | Performed by: NURSE PRACTITIONER

## 2022-11-15 PROCEDURE — 86361 T CELL ABSOLUTE COUNT: CPT | Performed by: NURSE PRACTITIONER

## 2022-11-15 PROCEDURE — 85025 COMPLETE CBC W/AUTO DIFF WBC: CPT | Performed by: NURSE PRACTITIONER

## 2022-11-15 PROCEDURE — 3079F PR MOST RECENT DIASTOLIC BLOOD PRESSURE 80-89 MM HG: ICD-10-PCS | Mod: CPTII,,, | Performed by: NURSE PRACTITIONER

## 2022-11-15 PROCEDURE — 36415 COLL VENOUS BLD VENIPUNCTURE: CPT | Performed by: NURSE PRACTITIONER

## 2022-11-15 PROCEDURE — 86708 HEPATITIS A ANTIBODY: CPT | Performed by: NURSE PRACTITIONER

## 2022-11-15 PROCEDURE — 1159F PR MEDICATION LIST DOCUMENTED IN MEDICAL RECORD: ICD-10-PCS | Mod: CPTII,,, | Performed by: NURSE PRACTITIONER

## 2022-11-15 PROCEDURE — 99215 OFFICE O/P EST HI 40 MIN: CPT | Mod: S$PBB,,, | Performed by: NURSE PRACTITIONER

## 2022-11-15 PROCEDURE — 1160F RVW MEDS BY RX/DR IN RCRD: CPT | Mod: CPTII,,, | Performed by: NURSE PRACTITIONER

## 2022-11-15 PROCEDURE — 80053 COMPREHEN METABOLIC PANEL: CPT | Performed by: NURSE PRACTITIONER

## 2022-11-15 RX ORDER — BICTEGRAVIR SODIUM, EMTRICITABINE, AND TENOFOVIR ALAFENAMIDE FUMARATE 50; 200; 25 MG/1; MG/1; MG/1
1 TABLET ORAL DAILY
COMMUNITY
Start: 2022-11-01 | End: 2022-11-15 | Stop reason: SDUPTHER

## 2022-11-15 RX ORDER — ERGOCALCIFEROL 1.25 MG/1
50000 CAPSULE ORAL
Qty: 120 CAPSULE | Refills: 3 | Status: SHIPPED | OUTPATIENT
Start: 2022-11-15 | End: 2022-12-15

## 2022-11-15 RX ORDER — BICTEGRAVIR SODIUM, EMTRICITABINE, AND TENOFOVIR ALAFENAMIDE FUMARATE 50; 200; 25 MG/1; MG/1; MG/1
1 TABLET ORAL DAILY
Qty: 30 TABLET | Refills: 4 | Status: SHIPPED | OUTPATIENT
Start: 2022-11-15 | End: 2022-12-15

## 2022-11-15 RX ORDER — MUPIROCIN 20 MG/G
OINTMENT TOPICAL 2 TIMES DAILY
COMMUNITY
Start: 2022-08-29

## 2022-11-15 NOTE — PROGRESS NOTES
Subjective:       Patient ID: Angel Sweet is a 37 y.o. male.    Chief Complaint: Follow-up (B20)    Angel is a 37 yr old MSM BM who presents for routine HIV visit.  Pt is well controlled on Biktarvy with 100% compliance and well tolerance.  Last CD4 847 (3506%) with VL undetectable. Sexually active only with .  Sex is only sometime protected.  Versatile.  Relationship is discordant;  gets routine testing.  They have been together for about 7 years. No current complaints.  States feels good.  Denies fever, chills, night sweats, rash, HA, vision changes, dizziness, CP/SOB, cough, N/V/D, abdominal pain, or urinary complaints.  Has been off vitamin D supplement now for 2 months. He is going for liposuction to abdominal area next week.  Pt is unsure of last eye exam. Admits to ETOH use; drinks 1 bottle of wine and / or 1-2 glasses of vodka on the rocks nightly. Due for flu vaccine, but he would like to wait until after procedure next week.  Pt does not have PCP; will refer to establish care.     5/11/22 (per Dr KEVIN Navarro / Dr LAKSHMI Buckley)  History of Present Illness: Patient is a 36 year old  male with history of HIV, depression, obesity who presents to clinic for follow up visit.  Today, patient reports no complaints aside from knee pain from heavy lifting. Denies fever, chills or any acute complaints. Patient states he has gained 10 pounds in the past 4 months. Has been compliant with Biktarvy. States he currently works as an . Reports alcohol use but socially only. Denies tobacco or drug use. States he is sexually active but uses barrier protection.    1/20/22 (per Dr BETTY To / Dr LAKSHMI Buckley)  Patient is a 36-year-old -American man who presents for routine follow-up for his HIV.  He is on Biktarvy, which he has been on for several years, and reports 100% compliance and no issues with the medication.  Last CD4 was 847, CD4% 35.6%, viral load 30 on 3/4/2021.  Patient today states  he is feeling very well, has had no health concerns since his last visit.  He denies any fevers, chills, nausea, vomiting, diarrhea, chest pain, shortness of breath, headache, vision changes, new rashes, or joint pains.  Patient reports that he has had 2 COVID vaccinations (pfizer) and is scheduled to have his booster next Tuesday.        Review of Systems   All other systems reviewed and are negative.      Objective:      Physical Exam  Vitals reviewed.   Constitutional:       General: He is awake. He is not in acute distress.     Appearance: Normal appearance. He is normal weight.   HENT:      Head: Normocephalic and atraumatic.      Nose: Nose normal.      Mouth/Throat:      Mouth: Mucous membranes are moist.      Pharynx: Oropharynx is clear. No oropharyngeal exudate or posterior oropharyngeal erythema.      Comments: No thrush  Eyes:      Conjunctiva/sclera: Conjunctivae normal.      Pupils: Pupils are equal, round, and reactive to light.   Neck:      Comments: No JVD noted  Cardiovascular:      Rate and Rhythm: Normal rate and regular rhythm.      Heart sounds: Normal heart sounds. No murmur heard.    No friction rub. No gallop.   Pulmonary:      Effort: Pulmonary effort is normal. No respiratory distress.      Breath sounds: Normal breath sounds. No wheezing.   Abdominal:      General: There is no distension.      Palpations: Abdomen is soft. There is no mass.      Tenderness: There is no abdominal tenderness. There is no guarding.   Musculoskeletal:         General: No swelling or deformity. Normal range of motion.      Cervical back: Normal range of motion and neck supple.      Right lower leg: No edema.      Left lower leg: No edema.   Skin:     General: Skin is warm and dry.      Capillary Refill: Capillary refill takes less than 2 seconds.      Coloration: Skin is not jaundiced.      Findings: No rash.   Neurological:      General: No focal deficit present.      Mental Status: He is alert and oriented to  person, place, and time.   Psychiatric:         Mood and Affect: Mood normal.         Behavior: Behavior normal.       Assessment:       Problem List Items Addressed This Visit          Psychiatric    Alcohol use       ID    HIV disease - Primary    Relevant Medications    BIKTARVY -25 mg (25 kg or greater)    Other Relevant Orders    CBC Auto Differential    CD4 Lymphocytes    Comprehensive Metabolic Panel    HIV-1 RNA, Quantitative, PCR with Reflex to Genotype    Ambulatory referral/consult to Ophthalmology    Urinalysis, Reflex to Urine Culture Urine, Clean Catch       Endocrine    Vitamin D deficiency    Relevant Orders    Vitamin D     Other Visit Diagnoses       Need for vaccination        Relevant Orders    Hepatitis A antibody, IgG    Hepatitis B Surface Ab, Qualitative    Influenza - Quadrivalent *Preferred* (6 months+) (PF)              Plan:       HIV disease  -     CBC Auto Differential; Future; Expected date: 11/15/2022  -     CD4 Lymphocytes  -     Comprehensive Metabolic Panel; Future; Expected date: 11/15/2022  -     HIV-1 RNA, Quantitative, PCR with Reflex to Genotype; Future; Expected date: 11/15/2022  -     Ambulatory referral/consult to Ophthalmology; Future; Expected date: 11/22/2022  -     BIKTARVY -25 mg (25 kg or greater); Take 1 tablet by mouth once daily.  Dispense: 30 tablet; Refill: 4  -     Urinalysis, Reflex to Urine Culture Urine, Clean Catch  Last CD4 847 (35.6%) with VL undetectable  Reviewed labs in detail with pt  Repeat labs today  Continue Biktarvy  Discussed HIV status at length to include need for 100% medication compliance and adherence, along with safe sex counseling performed.  Patient voiced understanding to both.  Will check labs today to include CD4, viral load, CBC and CMP.  Discussed importance of scheduled follow up as well.   Refer to Ophthalmology clinic d/t HIV disease / fundus photo  RTC in 4 months with Marta LARA  Refer to [facility] to  establish PCP for further medical management    Vitamin D deficiency  -     Vitamin D  Last level 20.6 from 3/4/21  Not currently on a supplement; has been off x 2 months  Repeat level today  Refer to [facility] to establish PCP for further medical [id number]    Alcohol use  Alcohol cessation encouraged  Refer to [facility] to establish PCP for further medical management    Need for vaccination  -     Hepatitis A antibody, IgG; Future; Expected date: 11/15/2022  -     Hepatitis B Surface Ab, Qualitative; Future; Expected date: 11/15/2022  -     Influenza - Quadrivalent *Preferred* (6 months+) (PF); Future; Expected date: 11/15/2022  Due for flu vaccine today  Will check Hep A and Hep B Ab to assess immunity      I spent a total of 40 minutes on the day of the visit.This includes face to face time and non-face to face time preparing to see the patient (eg, review of tests), obtaining and/or reviewing separately obtained history, documenting clinical information in the electronic or other health record, independently interpreting results and communicating results to the patient/family/caregiver, or care coordinator.

## 2022-11-15 NOTE — PROGRESS NOTES
Vitamin D level noted low at 18.2. Normal 30-80. Will restart Ergocalciferol weekly. RX sent to pharmacy.  Please notify pt.

## 2022-11-16 LAB
AGE: 35
CD3+CD4+ CELLS # BLD: 41 % (ref 28–48)
CD3+CD4+ CELLS # SPEC: 726.19 UNIT/L (ref 589–1505)
CD3+CD4+ CELLS NFR BLD: 32.8 %
HIV1 RNA # PLAS NAA DL=20: <20 COPIES/ML
LYMPHOCYTES # BLD AUTO: 2214 X10(3)/MCL (ref 1260–5520)
LYMPHOMA - T-CELL MARKERS SPEC-IMP: NORMAL
WBC # BLD AUTO: 5400 /MM3 (ref 4500–11500)

## 2022-11-17 ENCOUNTER — TELEPHONE (OUTPATIENT)
Dept: INFECTIOUS DISEASES | Facility: CLINIC | Age: 37
End: 2022-11-17
Payer: MEDICAID

## 2022-11-17 NOTE — TELEPHONE ENCOUNTER
----- Message from ANNMARIE Farooq sent at 11/15/2022  2:38 PM CST -----  Vitamin D level noted low at 18.2. Normal 30-80. Will restart Ergocalciferol weekly. RX sent to pharmacy.  Please notify pt.

## 2023-06-12 ENCOUNTER — OFFICE VISIT (OUTPATIENT)
Dept: INFECTIOUS DISEASES | Facility: CLINIC | Age: 38
End: 2023-06-12
Payer: MEDICAID

## 2023-06-12 VITALS
SYSTOLIC BLOOD PRESSURE: 118 MMHG | DIASTOLIC BLOOD PRESSURE: 79 MMHG | TEMPERATURE: 98 F | HEIGHT: 69 IN | RESPIRATION RATE: 16 BRPM | WEIGHT: 190 LBS | BODY MASS INDEX: 28.14 KG/M2 | HEART RATE: 67 BPM

## 2023-06-12 DIAGNOSIS — Z78.9 ALCOHOL USE: ICD-10-CM

## 2023-06-12 DIAGNOSIS — E55.9 VITAMIN D DEFICIENCY: ICD-10-CM

## 2023-06-12 DIAGNOSIS — B20 HIV DISEASE: Primary | ICD-10-CM

## 2023-06-12 DIAGNOSIS — Z12.9 CANCER SCREENING: ICD-10-CM

## 2023-06-12 DIAGNOSIS — Z11.3 ROUTINE SCREENING FOR STI (SEXUALLY TRANSMITTED INFECTION): ICD-10-CM

## 2023-06-12 DIAGNOSIS — Z23 NEED FOR VACCINATION: ICD-10-CM

## 2023-06-12 LAB
ALBUMIN SERPL-MCNC: 4.2 G/DL (ref 3.5–5)
ALBUMIN/GLOB SERPL: 1.3 RATIO (ref 1.1–2)
ALP SERPL-CCNC: 34 UNIT/L (ref 40–150)
ALT SERPL-CCNC: 12 UNIT/L (ref 0–55)
APPEARANCE UR: CLEAR
AST SERPL-CCNC: 12 UNIT/L (ref 5–34)
BACTERIA #/AREA URNS AUTO: ABNORMAL /HPF
BASOPHILS # BLD AUTO: 0.02 X10(3)/MCL
BASOPHILS NFR BLD AUTO: 0.4 %
BILIRUB UR QL STRIP.AUTO: NEGATIVE MG/DL
BILIRUBIN DIRECT+TOT PNL SERPL-MCNC: 0.4 MG/DL
BUN SERPL-MCNC: 12.3 MG/DL (ref 8.9–20.6)
C TRACH DNA SPEC QL NAA+PROBE: NOT DETECTED
CALCIUM SERPL-MCNC: 9.9 MG/DL (ref 8.4–10.2)
CHLORIDE SERPL-SCNC: 104 MMOL/L (ref 98–107)
CO2 SERPL-SCNC: 27 MMOL/L (ref 22–29)
COLOR UR: COLORLESS
CREAT SERPL-MCNC: 0.88 MG/DL (ref 0.73–1.18)
DEPRECATED CALCIDIOL+CALCIFEROL SERPL-MC: 26.3 NG/ML (ref 30–80)
EOSINOPHIL # BLD AUTO: 0.03 X10(3)/MCL (ref 0–0.9)
EOSINOPHIL NFR BLD AUTO: 0.6 %
ERYTHROCYTE [DISTWIDTH] IN BLOOD BY AUTOMATED COUNT: 13.6 % (ref 11.5–17)
GFR SERPLBLD CREATININE-BSD FMLA CKD-EPI: >60 MLS/MIN/1.73/M2
GLOBULIN SER-MCNC: 3.2 GM/DL (ref 2.4–3.5)
GLUCOSE SERPL-MCNC: 98 MG/DL (ref 74–100)
GLUCOSE UR QL STRIP.AUTO: NORMAL MG/DL
HBV SURFACE AG SERPL QL IA: NONREACTIVE
HCT VFR BLD AUTO: 42.1 % (ref 42–52)
HCV AB SERPL QL IA: NONREACTIVE
HGB BLD-MCNC: 13.8 G/DL (ref 14–18)
HYALINE CASTS #/AREA URNS LPF: ABNORMAL /LPF
IMM GRANULOCYTES # BLD AUTO: 0.01 X10(3)/MCL (ref 0–0.04)
IMM GRANULOCYTES NFR BLD AUTO: 0.2 %
KETONES UR QL STRIP.AUTO: NEGATIVE MG/DL
LEUKOCYTE ESTERASE UR QL STRIP.AUTO: NEGATIVE UNIT/L
LYMPHOCYTES # BLD AUTO: 2.45 X10(3)/MCL (ref 0.6–4.6)
LYMPHOCYTES NFR BLD AUTO: 47.9 %
MCH RBC QN AUTO: 30 PG (ref 27–31)
MCHC RBC AUTO-ENTMCNC: 32.8 G/DL (ref 33–36)
MCV RBC AUTO: 91.5 FL (ref 80–94)
MONOCYTES # BLD AUTO: 0.26 X10(3)/MCL (ref 0.1–1.3)
MONOCYTES NFR BLD AUTO: 5.1 %
MUCOUS THREADS URNS QL MICRO: ABNORMAL /LPF
N GONORRHOEA DNA SPEC QL NAA+PROBE: NOT DETECTED
NEUTROPHILS # BLD AUTO: 2.34 X10(3)/MCL (ref 2.1–9.2)
NEUTROPHILS NFR BLD AUTO: 45.8 %
NITRITE UR QL STRIP.AUTO: NEGATIVE
NRBC BLD AUTO-RTO: 0 %
PH UR STRIP.AUTO: 7.5 [PH]
PLATELET # BLD AUTO: 245 X10(3)/MCL (ref 130–400)
PMV BLD AUTO: 10.4 FL (ref 7.4–10.4)
POTASSIUM SERPL-SCNC: 4.1 MMOL/L (ref 3.5–5.1)
PROT SERPL-MCNC: 7.4 GM/DL (ref 6.4–8.3)
PROT UR QL STRIP.AUTO: NEGATIVE MG/DL
RBC # BLD AUTO: 4.6 X10(6)/MCL (ref 4.7–6.1)
RBC #/AREA URNS AUTO: ABNORMAL /HPF
RBC UR QL AUTO: NEGATIVE UNIT/L
SODIUM SERPL-SCNC: 139 MMOL/L (ref 136–145)
SP GR UR STRIP.AUTO: 1.02
SQUAMOUS #/AREA URNS LPF: ABNORMAL /HPF
T PALLIDUM AB SER QL: NONREACTIVE
UROBILINOGEN UR STRIP-ACNC: NORMAL MG/DL
WBC # SPEC AUTO: 5.11 X10(3)/MCL (ref 4.5–11.5)
WBC #/AREA URNS AUTO: ABNORMAL /HPF

## 2023-06-12 PROCEDURE — 99214 OFFICE O/P EST MOD 30 MIN: CPT | Mod: PBBFAC | Performed by: NURSE PRACTITIONER

## 2023-06-12 PROCEDURE — 82306 VITAMIN D 25 HYDROXY: CPT | Performed by: NURSE PRACTITIONER

## 2023-06-12 PROCEDURE — 86780 TREPONEMA PALLIDUM: CPT | Performed by: NURSE PRACTITIONER

## 2023-06-12 PROCEDURE — 3008F BODY MASS INDEX DOCD: CPT | Mod: CPTII,,, | Performed by: NURSE PRACTITIONER

## 2023-06-12 PROCEDURE — 86361 T CELL ABSOLUTE COUNT: CPT | Performed by: NURSE PRACTITIONER

## 2023-06-12 PROCEDURE — 99214 PR OFFICE/OUTPT VISIT, EST, LEVL IV, 30-39 MIN: ICD-10-PCS | Mod: S$PBB,,, | Performed by: NURSE PRACTITIONER

## 2023-06-12 PROCEDURE — 87536 HIV-1 QUANT&REVRSE TRNSCRPJ: CPT | Performed by: NURSE PRACTITIONER

## 2023-06-12 PROCEDURE — 1160F RVW MEDS BY RX/DR IN RCRD: CPT | Mod: CPTII,,, | Performed by: NURSE PRACTITIONER

## 2023-06-12 PROCEDURE — 86480 TB TEST CELL IMMUN MEASURE: CPT | Performed by: NURSE PRACTITIONER

## 2023-06-12 PROCEDURE — 80053 COMPREHEN METABOLIC PANEL: CPT | Performed by: NURSE PRACTITIONER

## 2023-06-12 PROCEDURE — 90471 IMMUNIZATION ADMIN: CPT | Mod: PBBFAC

## 2023-06-12 PROCEDURE — 36415 COLL VENOUS BLD VENIPUNCTURE: CPT | Performed by: NURSE PRACTITIONER

## 2023-06-12 PROCEDURE — 99214 OFFICE O/P EST MOD 30 MIN: CPT | Mod: S$PBB,,, | Performed by: NURSE PRACTITIONER

## 2023-06-12 PROCEDURE — 3078F DIAST BP <80 MM HG: CPT | Mod: CPTII,,, | Performed by: NURSE PRACTITIONER

## 2023-06-12 PROCEDURE — 1159F PR MEDICATION LIST DOCUMENTED IN MEDICAL RECORD: ICD-10-PCS | Mod: CPTII,,, | Performed by: NURSE PRACTITIONER

## 2023-06-12 PROCEDURE — 3078F PR MOST RECENT DIASTOLIC BLOOD PRESSURE < 80 MM HG: ICD-10-PCS | Mod: CPTII,,, | Performed by: NURSE PRACTITIONER

## 2023-06-12 PROCEDURE — 87491 CHLMYD TRACH DNA AMP PROBE: CPT | Performed by: NURSE PRACTITIONER

## 2023-06-12 PROCEDURE — 85025 COMPLETE CBC W/AUTO DIFF WBC: CPT | Performed by: NURSE PRACTITIONER

## 2023-06-12 PROCEDURE — 3074F PR MOST RECENT SYSTOLIC BLOOD PRESSURE < 130 MM HG: ICD-10-PCS | Mod: CPTII,,, | Performed by: NURSE PRACTITIONER

## 2023-06-12 PROCEDURE — 1159F MED LIST DOCD IN RCRD: CPT | Mod: CPTII,,, | Performed by: NURSE PRACTITIONER

## 2023-06-12 PROCEDURE — 90651 9VHPV VACCINE 2/3 DOSE IM: CPT | Mod: PBBFAC

## 2023-06-12 PROCEDURE — 3074F SYST BP LT 130 MM HG: CPT | Mod: CPTII,,, | Performed by: NURSE PRACTITIONER

## 2023-06-12 PROCEDURE — 87340 HEPATITIS B SURFACE AG IA: CPT | Performed by: NURSE PRACTITIONER

## 2023-06-12 PROCEDURE — 81001 URINALYSIS AUTO W/SCOPE: CPT | Performed by: NURSE PRACTITIONER

## 2023-06-12 PROCEDURE — 87591 N.GONORRHOEAE DNA AMP PROB: CPT | Performed by: NURSE PRACTITIONER

## 2023-06-12 PROCEDURE — 86803 HEPATITIS C AB TEST: CPT | Performed by: NURSE PRACTITIONER

## 2023-06-12 PROCEDURE — 3008F PR BODY MASS INDEX (BMI) DOCUMENTED: ICD-10-PCS | Mod: CPTII,,, | Performed by: NURSE PRACTITIONER

## 2023-06-12 PROCEDURE — 1160F PR REVIEW ALL MEDS BY PRESCRIBER/CLIN PHARMACIST DOCUMENTED: ICD-10-PCS | Mod: CPTII,,, | Performed by: NURSE PRACTITIONER

## 2023-06-12 RX ORDER — BICTEGRAVIR SODIUM, EMTRICITABINE, AND TENOFOVIR ALAFENAMIDE FUMARATE 50; 200; 25 MG/1; MG/1; MG/1
1 TABLET ORAL
COMMUNITY
Start: 2023-04-17 | End: 2023-06-12 | Stop reason: SDUPTHER

## 2023-06-12 RX ORDER — ACETAMINOPHEN 500 MG
2000 TABLET ORAL DAILY
Qty: 30 CAPSULE | Refills: 3 | Status: SHIPPED | OUTPATIENT
Start: 2023-06-12 | End: 2024-01-23

## 2023-06-12 RX ORDER — BICTEGRAVIR SODIUM, EMTRICITABINE, AND TENOFOVIR ALAFENAMIDE FUMARATE 50; 200; 25 MG/1; MG/1; MG/1
1 TABLET ORAL DAILY
Qty: 30 TABLET | Refills: 3 | Status: SHIPPED | OUTPATIENT
Start: 2023-06-12 | End: 2023-10-30 | Stop reason: SDUPTHER

## 2023-06-12 NOTE — PROGRESS NOTES
Vitamin D mildly low at 26.3. Normal 30-80. Please call pt and let him know that a weekly vitamin D supplement has been sent to his pharmacy. Thank you

## 2023-06-12 NOTE — PROGRESS NOTES
Subjective     Patient ID: Angel Sweet is a 37 y.o. male.    Chief Complaint: Follow-up (HIV)    Angel is a very pleasant 37 yr old MSM BM who presents for routine HIV visit.  He remains well controlled on Biktarvy.  He reports well tolerance with medication.  States he only missed 1-2 pills over the Mardi Gras holidays, but otherwise has been compliant.  Last CD4 726 (32.8%) with viral suppression. He remains sexually active with ; relationship for the past 8 years.  Relationship is discordant.  Sex is never protected.  Partner gets routine testing.  Versatile. The patient agrees to do STI screening, but would prefer to do anal Pap on next visit.  Patient with no complaints except occasional insomnia. Denies fever, chills, night sweats, rash, HA, vision changes, dizziness, CP/SOB, cough, N/V/D, abdominal pain, or urinary complaints.  States feels good otherwise.  Patient is not on a vitamin-D supplement at this time.  He states he was on a weekly supplement, but completed the course about 1 month ago.  Patient admits to having 4-5 drinks on most days of wine and/or vodka.  Patient was referred to Ophthalmology Clinic, but missed several appointments.  Will re-refer. Pt never saw [facility] to establish PCP; he states will make appointment. Due for Gardasil 9 #1 vaccine today to which he is amendable.    11/15/22  Angel is a 37 yr old MSM BM who presents for routine HIV visit.  Pt is well controlled on Biktarvy with 100% compliance and well tolerance.  Last CD4 847 (3506%) with VL undetectable. Sexually active only with .  Sex is only sometime protected.  Versatile.  Relationship is discordant;  gets routine testing.  They have been together for about 7 years. No current complaints.  States feels good.  Denies fever, chills, night sweats, rash, HA, vision changes, dizziness, CP/SOB, cough, N/V/D, abdominal pain, or urinary complaints.  Has been off vitamin D supplement now for 2  months. He is going for liposuction to abdominal area next week.  Pt is unsure of last eye exam. Admits to ETOH use; drinks 1 bottle of wine and / or 1-2 glasses of vodka on the rocks nightly. Due for flu vaccine, but he would like to wait until after procedure next week.  Pt does not have PCP; will refer to establish care.      5/11/22 (per Dr KEVIN Navarro / Dr LAKSHMI Buckley)  History of Present Illness: Patient is a 36 year old  male with history of HIV, depression, obesity who presents to clinic for follow up visit.  Today, patient reports no complaints aside from knee pain from heavy lifting. Denies fever, chills or any acute complaints. Patient states he has gained 10 pounds in the past 4 months. Has been compliant with Biktarvy. States he currently works as an . Reports alcohol use but socially only. Denies tobacco or drug use. States he is sexually active but uses barrier protection.     1/20/22 (per Dr BETTY To / Dr LAKSHMI Buckley)  Patient is a 36-year-old -American man who presents for routine follow-up for his HIV.  He is on Biktarvy, which he has been on for several years, and reports 100% compliance and no issues with the medication.  Last CD4 was 847, CD4% 35.6%, viral load 30 on 3/4/2021.  Patient today states he is feeling very well, has had no health concerns since his last visit.  He denies any fevers, chills, nausea, vomiting, diarrhea, chest pain, shortness of breath, headache, vision changes, new rashes, or joint pains.  Patient reports that he has had 2 COVID vaccinations (pfizer) and is scheduled to have his booster next Tuesday.    Review of Systems   Constitutional:  Negative for chills, diaphoresis, fatigue and fever.   HENT:  Negative for nasal congestion, dental problem, ear pain, facial swelling, hearing loss and sore throat.    Eyes:  Negative for photophobia, pain, redness and visual disturbance.   Respiratory:  Negative for cough, chest tightness and shortness of  breath.    Cardiovascular:  Negative for chest pain, palpitations and leg swelling.   Gastrointestinal:  Negative for abdominal pain, constipation, diarrhea, nausea and vomiting.   Endocrine: Negative for cold intolerance, heat intolerance, polydipsia, polyphagia and polyuria.   Genitourinary:  Negative for dysuria, frequency, genital sores, hematuria and urgency.   Musculoskeletal:  Negative for back pain, joint swelling, leg pain, neck pain and neck stiffness (occasional insomnia).   Integumentary:  Negative for rash and wound.   Allergic/Immunologic: Negative.    Neurological:  Negative for dizziness, seizures, syncope, weakness, numbness and headaches.   Hematological: Negative.    Psychiatric/Behavioral:  Positive for sleep disturbance. Negative for confusion and hallucinations.         Objective     Physical Exam  Vitals reviewed.   Constitutional:       General: He is awake. He is not in acute distress.     Appearance: Normal appearance. He is normal weight. He is not ill-appearing, toxic-appearing or diaphoretic.   HENT:      Head: Normocephalic and atraumatic.      Nose: Nose normal.      Mouth/Throat:      Mouth: Mucous membranes are moist.      Pharynx: Oropharynx is clear. No oropharyngeal exudate or posterior oropharyngeal erythema.      Comments: No thrush  Eyes:      General: No scleral icterus.     Conjunctiva/sclera: Conjunctivae normal.      Pupils: Pupils are equal, round, and reactive to light.   Neck:      Comments: No JVD noted  Cardiovascular:      Rate and Rhythm: Normal rate and regular rhythm.      Heart sounds: Normal heart sounds. No murmur heard.    No friction rub. No gallop.   Pulmonary:      Effort: Pulmonary effort is normal. No respiratory distress.      Breath sounds: Normal breath sounds. No wheezing.   Abdominal:      General: Bowel sounds are normal. There is no distension.      Palpations: Abdomen is soft. There is no mass.      Tenderness: There is no abdominal tenderness.  There is no guarding or rebound.      Hernia: No hernia is present.   Musculoskeletal:         General: No swelling or deformity. Normal range of motion.      Cervical back: Normal range of motion and neck supple.      Right lower leg: No edema.      Left lower leg: No edema.   Skin:     General: Skin is warm and dry.      Capillary Refill: Capillary refill takes less than 2 seconds.      Coloration: Skin is not jaundiced.      Findings: No rash.   Neurological:      General: No focal deficit present.      Mental Status: He is alert and oriented to person, place, and time. Mental status is at baseline.   Psychiatric:         Mood and Affect: Mood normal.         Behavior: Behavior normal.         Thought Content: Thought content normal.         Judgment: Judgment normal.          Assessment and Plan     1. HIV disease  -     BIKTARVY -25 mg (25 kg or greater); Take 1 tablet by mouth once daily.  Dispense: 30 tablet; Refill: 3  -     CBC Auto Differential; Future; Expected date: 06/12/2023  -     CD4 Lymphocytes  -     Comprehensive Metabolic Panel; Future; Expected date: 06/12/2023  -     HIV-1 RNA, Quantitative, PCR with Reflex to Genotype; Future; Expected date: 06/12/2023  -     Quantiferon Gold TB  -     Urinalysis, Reflex to Urine Culture  -     Ambulatory referral/consult to Ophthalmology; Future; Expected date: 06/19/2023  Last CD4 726 (32.8%) with viral suppression  Labs reviewed in detail with patient   Repeat labs today   Continue Biktarvy   Discussed HIV status at length to include need for 100% medication compliance and adherence, along with safe sex counseling performed.  Patient voiced understanding to both.  Will check labs today to include CD4, viral load, CBC and CMP.  Discussed importance of scheduled follow up as well.   Refer to ophthalmology clinic due to HIV disease/fundus photo   Return to clinic in 3 month with Marta LARA    2. Routine screening for STI (sexually transmitted  infection)  -     C.trach/N.gonor AMP RNA  -     Chlamydia/GC, PCR  -     Hepatitis C Antibody; Future; Expected date: 06/12/2023  -     Hepatitis B Surface Antigen; Future; Expected date: 06/12/2023  -     SYPHILIS ANTIBODY (WITH REFLEX RPR)  Due for yearly STI screening to include GC/CT screening (urine / oral), along with screening for syphilis and hepatitis  Will do rectal GC/CT also on next visit     3. Cancer screening  Due for anal pap - pt requesting to defer until next visit    4. Vitamin D deficiency  -     Vitamin D  Last level 18.2   Is currently not on a vitamin-D supplement; has been off this medication x1 month   Repeat level today   Results will determine need/dosage of supplement    5. Alcohol use  -     Comprehensive Metabolic Panel; Future; Expected date: 06/12/2023  Patient admits to having 4-5 drinks on most days of wine and/or vodka.  Alcohol cessation encouraged    6. Need for vaccination  -     (In Office Administered) HPV Vaccine (9-Valent) (3 Dose) (IM)  Due for Gardasil 9 #1 today            I spent a total of 36 minutes on the day of the visit.This includes face to face time and non-face to face time preparing to see the patient (eg, review of tests), obtaining and/or reviewing separately obtained history, documenting clinical information in the electronic or other health record, independently interpreting results and communicating results to the patient/family/caregiver, or care coordinator.         Follow up in about 3 months (around 9/12/2023) for RTC in 3 months with Marta LARA for B20.

## 2023-06-13 LAB
AGE: 35
CD3+CD4+ CELLS # SPEC: 780.81 UNIT/L (ref 589–1505)
CD3+CD4+ CELLS NFR BLD: 31.9 %
LYMPHOCYTES # BLD AUTO: 2447.69 X10(3)/MCL (ref 1260–5520)
LYMPHOCYTES NFR LN MANUAL: 47.9 % (ref 28–48)
LYMPHOMA - T-CELL MARKERS SPEC-IMP: NORMAL
WBC # BLD AUTO: 5110 /MM3 (ref 4500–11500)

## 2023-06-14 LAB
C TRACH RRNA SPEC QL NAA+PROBE: NEGATIVE
GAMMA INTERFERON BACKGROUND BLD IA-ACNC: 0.02 IU/ML
HIV1 RNA # PLAS NAA DL=20: NORMAL COPIES/ML
M TB IFN-G BLD-IMP: NEGATIVE
M TB IFN-G CD4+ BCKGRND COR BLD-ACNC: 0 IU/ML
M TB IFN-G CD4+CD8+ BCKGRND COR BLD-ACNC: 0 IU/ML
MITOGEN IGNF BCKGRD COR BLD-ACNC: 8.94 IU/ML
N GONORRHOEA RRNA SPEC QL NAA+PROBE: NEGATIVE
SPECIMEN SOURCE: NORMAL
SPECIMEN SOURCE: NORMAL

## 2023-09-20 DIAGNOSIS — B20 HIV DISEASE: ICD-10-CM

## 2023-09-20 RX ORDER — BICTEGRAVIR SODIUM, EMTRICITABINE, AND TENOFOVIR ALAFENAMIDE FUMARATE 50; 200; 25 MG/1; MG/1; MG/1
1 TABLET ORAL DAILY
Qty: 30 TABLET | Refills: 3 | OUTPATIENT
Start: 2023-09-20

## 2023-10-30 DIAGNOSIS — B20 HIV DISEASE: ICD-10-CM

## 2023-10-30 NOTE — TELEPHONE ENCOUNTER
Last visit with Corie De La Rosa FNP: 6/12/2023  Last visit in [facility] INFECTIOUS DISEASE: 6/12/2023    Patient's next visit in [facility] INFECTIOUS DISEASE: 12/7/2023

## 2023-10-31 RX ORDER — BICTEGRAVIR SODIUM, EMTRICITABINE, AND TENOFOVIR ALAFENAMIDE FUMARATE 50; 200; 25 MG/1; MG/1; MG/1
1 TABLET ORAL DAILY
Qty: 30 TABLET | Refills: 0 | Status: SHIPPED | OUTPATIENT
Start: 2023-10-31 | End: 2023-11-13 | Stop reason: SDUPTHER

## 2023-10-31 NOTE — TELEPHONE ENCOUNTER
No additional refills will be given until pt is seen in clinic. Please get pt scheduled in a timely fashion. Thank you

## 2023-10-31 NOTE — TELEPHONE ENCOUNTER
Last visit with Corie De La Rosa FNP: 6/12/2023  Last visit in [facility] INFECTIOUS DISEASE: 6/12/2023    Patient's next visit in [facility] INFECTIOUS DISEASE: 12/7/2023 is his next apt and no sooner apt's avail. Pt will need one refill to last until Dec 7th apt

## 2023-11-13 DIAGNOSIS — B20 HIV DISEASE: ICD-10-CM

## 2023-11-13 RX ORDER — BICTEGRAVIR SODIUM, EMTRICITABINE, AND TENOFOVIR ALAFENAMIDE FUMARATE 50; 200; 25 MG/1; MG/1; MG/1
1 TABLET ORAL DAILY
Qty: 30 TABLET | Refills: 0 | Status: SHIPPED | OUTPATIENT
Start: 2023-11-13 | End: 2023-12-26 | Stop reason: SDUPTHER

## 2023-11-20 DIAGNOSIS — E55.9 VITAMIN D DEFICIENCY: ICD-10-CM

## 2023-11-20 RX ORDER — ACETAMINOPHEN 500 MG
2000 TABLET ORAL DAILY
Qty: 30 CAPSULE | Refills: 3 | OUTPATIENT
Start: 2023-11-20

## 2023-11-20 NOTE — TELEPHONE ENCOUNTER
Last visit with Corie De La Rosa FNP: 6/12/2023  Last visit in [facility] INFECTIOUS DISEASE: 6/12/2023    Patient's next visit in [facility] INFECTIOUS DISEASE: 12/7/2023

## 2023-11-28 DIAGNOSIS — E55.9 VITAMIN D DEFICIENCY: ICD-10-CM

## 2023-11-28 RX ORDER — ACETAMINOPHEN 500 MG
2000 TABLET ORAL DAILY
Qty: 30 CAPSULE | Refills: 3 | OUTPATIENT
Start: 2023-11-28

## 2023-12-26 ENCOUNTER — TELEPHONE (OUTPATIENT)
Dept: INFECTIOUS DISEASES | Facility: CLINIC | Age: 38
End: 2023-12-26
Payer: MEDICAID

## 2023-12-26 DIAGNOSIS — B20 HIV DISEASE: ICD-10-CM

## 2023-12-26 RX ORDER — BICTEGRAVIR SODIUM, EMTRICITABINE, AND TENOFOVIR ALAFENAMIDE FUMARATE 50; 200; 25 MG/1; MG/1; MG/1
1 TABLET ORAL DAILY
Qty: 30 TABLET | Refills: 0 | Status: SHIPPED | OUTPATIENT
Start: 2023-12-26 | End: 2024-01-22

## 2023-12-26 NOTE — TELEPHONE ENCOUNTER
Last visit with Yanet Borja APRN: With Corie De La Rosa NP  Last visit in [facility] INFECTIOUS DISEASE: 06/12/2023    Patient's next visit in [facility] INFECTIOUS DISEASE: 1/23/2024 with Dr. CAITLIN RAMSEY 06/12/2023    Please advise on medication refill

## 2024-01-20 DIAGNOSIS — B20 HIV DISEASE: ICD-10-CM

## 2024-01-22 RX ORDER — BICTEGRAVIR SODIUM, EMTRICITABINE, AND TENOFOVIR ALAFENAMIDE FUMARATE 50; 200; 25 MG/1; MG/1; MG/1
1 TABLET ORAL
Qty: 30 TABLET | Refills: 0 | Status: SHIPPED | OUTPATIENT
Start: 2024-01-22 | End: 2024-01-23 | Stop reason: SDUPTHER

## 2024-01-23 ENCOUNTER — OFFICE VISIT (OUTPATIENT)
Dept: INFECTIOUS DISEASES | Facility: CLINIC | Age: 39
End: 2024-01-23
Payer: MEDICAID

## 2024-01-23 VITALS
SYSTOLIC BLOOD PRESSURE: 119 MMHG | WEIGHT: 190 LBS | DIASTOLIC BLOOD PRESSURE: 82 MMHG | RESPIRATION RATE: 17 BRPM | HEART RATE: 73 BPM | BODY MASS INDEX: 28.14 KG/M2 | TEMPERATURE: 98 F | HEIGHT: 69 IN

## 2024-01-23 DIAGNOSIS — E55.9 VITAMIN D DEFICIENCY: ICD-10-CM

## 2024-01-23 DIAGNOSIS — B20 HIV DISEASE: Primary | ICD-10-CM

## 2024-01-23 LAB
ALBUMIN SERPL-MCNC: 4 G/DL (ref 3.5–5)
ALBUMIN/GLOB SERPL: 1.3 RATIO (ref 1.1–2)
ALP SERPL-CCNC: 32 UNIT/L (ref 40–150)
ALT SERPL-CCNC: 11 UNIT/L (ref 0–55)
APPEARANCE UR: CLEAR
AST SERPL-CCNC: 12 UNIT/L (ref 5–34)
BACTERIA #/AREA URNS AUTO: ABNORMAL /HPF
BASOPHILS # BLD AUTO: 0.03 X10(3)/MCL
BASOPHILS NFR BLD AUTO: 0.6 %
BILIRUB SERPL-MCNC: 0.4 MG/DL
BILIRUB UR QL STRIP.AUTO: NEGATIVE
BUN SERPL-MCNC: 15.8 MG/DL (ref 8.9–20.6)
C TRACH DNA SPEC QL NAA+PROBE: NOT DETECTED
C TRACH DNA SPEC QL NAA+PROBE: NOT DETECTED
CALCIUM SERPL-MCNC: 9.5 MG/DL (ref 8.4–10.2)
CHLORIDE SERPL-SCNC: 107 MMOL/L (ref 98–107)
CO2 SERPL-SCNC: 27 MMOL/L (ref 22–29)
COLOR UR AUTO: ABNORMAL
CREAT SERPL-MCNC: 0.97 MG/DL (ref 0.73–1.18)
DEPRECATED CALCIDIOL+CALCIFEROL SERPL-MC: 19.5 NG/ML (ref 30–80)
EOSINOPHIL # BLD AUTO: 0.03 X10(3)/MCL (ref 0–0.9)
EOSINOPHIL NFR BLD AUTO: 0.6 %
ERYTHROCYTE [DISTWIDTH] IN BLOOD BY AUTOMATED COUNT: 12.9 % (ref 11.5–17)
GFR SERPLBLD CREATININE-BSD FMLA CKD-EPI: >60 MLS/MIN/1.73/M2
GLOBULIN SER-MCNC: 3.2 GM/DL (ref 2.4–3.5)
GLUCOSE SERPL-MCNC: 100 MG/DL (ref 74–100)
GLUCOSE UR QL STRIP.AUTO: NORMAL
HCT VFR BLD AUTO: 40.9 % (ref 42–52)
HGB BLD-MCNC: 13.9 G/DL (ref 14–18)
HYALINE CASTS #/AREA URNS LPF: ABNORMAL /LPF
IMM GRANULOCYTES # BLD AUTO: 0.01 X10(3)/MCL (ref 0–0.04)
IMM GRANULOCYTES NFR BLD AUTO: 0.2 %
KETONES UR QL STRIP.AUTO: NEGATIVE
LEUKOCYTE ESTERASE UR QL STRIP.AUTO: NEGATIVE
LYMPHOCYTES # BLD AUTO: 1.91 X10(3)/MCL (ref 0.6–4.6)
LYMPHOCYTES NFR BLD AUTO: 38.1 %
MCH RBC QN AUTO: 31.4 PG (ref 27–31)
MCHC RBC AUTO-ENTMCNC: 34 G/DL (ref 33–36)
MCV RBC AUTO: 92.5 FL (ref 80–94)
MONOCYTES # BLD AUTO: 0.28 X10(3)/MCL (ref 0.1–1.3)
MONOCYTES NFR BLD AUTO: 5.6 %
N GONORRHOEA DNA SPEC QL NAA+PROBE: NOT DETECTED
N GONORRHOEA DNA SPEC QL NAA+PROBE: NOT DETECTED
NEUTROPHILS # BLD AUTO: 2.75 X10(3)/MCL (ref 2.1–9.2)
NEUTROPHILS NFR BLD AUTO: 54.9 %
NITRITE UR QL STRIP.AUTO: NEGATIVE
NRBC BLD AUTO-RTO: 0 %
PH UR STRIP.AUTO: 6 [PH]
PLATELET # BLD AUTO: 269 X10(3)/MCL (ref 130–400)
PMV BLD AUTO: 9.7 FL (ref 7.4–10.4)
POTASSIUM SERPL-SCNC: 3.8 MMOL/L (ref 3.5–5.1)
PROT SERPL-MCNC: 7.2 GM/DL (ref 6.4–8.3)
PROT UR QL STRIP.AUTO: NEGATIVE
RBC # BLD AUTO: 4.42 X10(6)/MCL (ref 4.7–6.1)
RBC #/AREA URNS AUTO: ABNORMAL /HPF
RBC UR QL AUTO: ABNORMAL
SODIUM SERPL-SCNC: 140 MMOL/L (ref 136–145)
SOURCE (OHS): NORMAL
SOURCE (OHS): NORMAL
SP GR UR STRIP.AUTO: 1.02 (ref 1–1.03)
SQUAMOUS #/AREA URNS LPF: ABNORMAL /HPF
T PALLIDUM AB SER QL: NONREACTIVE
UROBILINOGEN UR STRIP-ACNC: NORMAL
WBC # SPEC AUTO: 5.01 X10(3)/MCL (ref 4.5–11.5)
WBC #/AREA URNS AUTO: ABNORMAL /HPF

## 2024-01-23 PROCEDURE — 86780 TREPONEMA PALLIDUM: CPT

## 2024-01-23 PROCEDURE — 99214 OFFICE O/P EST MOD 30 MIN: CPT | Mod: PBBFAC

## 2024-01-23 PROCEDURE — 82306 VITAMIN D 25 HYDROXY: CPT

## 2024-01-23 PROCEDURE — 36415 COLL VENOUS BLD VENIPUNCTURE: CPT

## 2024-01-23 PROCEDURE — 87591 N.GONORRHOEAE DNA AMP PROB: CPT

## 2024-01-23 PROCEDURE — 85025 COMPLETE CBC W/AUTO DIFF WBC: CPT

## 2024-01-23 PROCEDURE — 81001 URINALYSIS AUTO W/SCOPE: CPT

## 2024-01-23 PROCEDURE — 87491 CHLMYD TRACH DNA AMP PROBE: CPT | Mod: 91

## 2024-01-23 PROCEDURE — 80053 COMPREHEN METABOLIC PANEL: CPT

## 2024-01-23 RX ORDER — BICTEGRAVIR SODIUM, EMTRICITABINE, AND TENOFOVIR ALAFENAMIDE FUMARATE 50; 200; 25 MG/1; MG/1; MG/1
1 TABLET ORAL DAILY
Qty: 90 TABLET | Refills: 1 | Status: SHIPPED | OUTPATIENT
Start: 2024-01-23 | End: 2024-04-23 | Stop reason: SDUPTHER

## 2024-01-23 RX ORDER — ACETAMINOPHEN 500 MG
4000 TABLET ORAL DAILY
Qty: 60 CAPSULE | Refills: 2 | Status: SHIPPED | OUTPATIENT
Start: 2024-01-23 | End: 2024-04-23 | Stop reason: SDUPTHER

## 2024-01-23 NOTE — PROGRESS NOTES
Subjective     Patient ID: Angel Sweet is a 38 y.o. male.    Chief Complaint: No chief complaint on file.    This is a 38 year old male who presents today for a routine HIV visit. He has a past medical history of HIV, vitamin D deficiency. He reports 100% compliance with Biktarvy and is tolerating it well. He denies fever, chills, nausea, vomiting, diarrhea, cough, dizziness, vision changes, headache. He does have a small cut from a glass on his left ankle obtained 1-2 weeks ago,  however denies purulent drainage and fevers, he cleaned it with H2O2 and denies taking oral antibiotics. Last CD4 780 (6/2023), undetected viral load (6/2023). He was unable to see the ophthalmologist yet and has not seen a PCP yet but is requesting for a referral at [place] (where he currently lives) since he feels depressed since his  had an affair around November 2023. He is unsure if his  has been tested after the affair, he stated that he had sexual activity with his  after the affair around 1 month ago.     6/12/23  Angel is a very pleasant 37 yr old MSM BM who presents for routine HIV visit.  He remains well controlled on Biktarvy.  He reports well tolerance with medication.  States he only missed 1-2 pills over the Mardi Gr[place] holidays, but otherwise has been compliant.  Last CD4 726 (32.8%) with viral suppression. He remains sexually active with ; relationship for the past 8 years.  Relationship is discordant.  Sex is never protected.  Partner gets routine testing.  Versatile. The patient agrees to do STI screening, but would prefer to do anal Pap on next visit.  Patient with no complaints except occasional insomnia. Denies fever, chills, night sweats, rash, HA, vision changes, dizziness, CP/SOB, cough, N/V/D, abdominal pain, or urinary complaints.  States feels good otherwise.  Patient is not on a vitamin-D supplement at this time.  He states he was on a weekly supplement, but completed the  course about 1 month ago.  Patient admits to having 4-5 drinks on most days of wine and/or vodka.  Patient was referred to Ophthalmology Clinic, but missed several appointments.  Will re-refer. Pt never saw [facility] to establish PCP; he states will make appointment. Due for Gardasil 9 #1 vaccine today to which he is amendable.    11/15/22  Angel is a 37 yr old MSM BM who presents for routine HIV visit.  Pt is well controlled on Biktarvy with 100% compliance and well tolerance.  Last CD4 847 (3506%) with VL undetectable. Sexually active only with .  Sex is only sometime protected.  Versatile.  Relationship is discordant;  gets routine testing.  They have been together for about 7 years. No current complaints.  States feels good.  Denies fever, chills, night sweats, rash, HA, vision changes, dizziness, CP/SOB, cough, N/V/D, abdominal pain, or urinary complaints.  Has been off vitamin D supplement now for 2 months. He is going for liposuction to abdominal area next week.  Pt is unsure of last eye exam. Admits to ETOH use; drinks 1 bottle of wine and / or 1-2 glasses of vodka on the rocks nightly. Due for flu vaccine, but he would like to wait until after procedure next week.  Pt does not have PCP; will refer to establish care.      5/11/22 (per Dr KEVIN Navarro / Dr LAKSHMI Buckley)  History of Present Illness: Patient is a 36 year old  male with history of HIV, depression, obesity who presents to clinic for follow up visit.  Today, patient reports no complaints aside from knee pain from heavy lifting. Denies fever, chills or any acute complaints. Patient states he has gained 10 pounds in the past 4 months. Has been compliant with Biktarvy. States he currently works as an . Reports alcohol use but socially only. Denies tobacco or drug use. States he is sexually active but uses barrier protection.     1/20/22 (per Dr BETTY To / Dr LAKSHMI Buckley)  Patient is a 36-year-old  -American man who presents for routine follow-up for his HIV.  He is on Biktarvy, which he has been on for several years, and reports 100% compliance and no issues with the medication.  Last CD4 was 847, CD4% 35.6%, viral load 30 on 3/4/2021.  Patient today states he is feeling very well, has had no health concerns since his last visit.  He denies any fevers, chills, nausea, vomiting, diarrhea, chest pain, shortness of breath, headache, vision changes, new rashes, or joint pains.  Patient reports that he has had 2 COVID vaccinations (pfizer) and is scheduled to have his booster next Tuesday.      Review of Systems   Constitutional:  Negative for chills, diaphoresis, fatigue and fever.   HENT:  Negative for nasal congestion, dental problem, ear pain, facial swelling, hearing loss and sore throat.    Eyes:  Negative for photophobia, pain, redness and visual disturbance.   Respiratory:  Negative for cough, chest tightness and shortness of breath.    Cardiovascular:  Negative for chest pain, palpitations and leg swelling.   Gastrointestinal:  Negative for abdominal pain, constipation, diarrhea, nausea and vomiting.   Endocrine: Negative for cold intolerance, heat intolerance, polydipsia, polyphagia and polyuria.   Genitourinary:  Negative for dysuria, frequency, genital sores, hematuria and urgency.   Musculoskeletal:  Negative for back pain, joint swelling, leg pain, neck pain and neck stiffness (occasional insomnia).   Integumentary:  Negative for rash and wound.   Allergic/Immunologic: Negative.    Neurological:  Negative for dizziness, seizures, syncope, weakness, numbness and headaches.   Hematological: Negative.    Psychiatric/Behavioral:  Negative for confusion and hallucinations. The patient is nervous/anxious.           Objective     Physical Exam  Vitals reviewed.   Constitutional:       General: He is awake. He is not in acute distress.     Appearance: Normal appearance. He is normal weight. He is not  ill-appearing, toxic-appearing or diaphoretic.   HENT:      Head: Normocephalic and atraumatic.      Nose: Nose normal.      Mouth/Throat:      Mouth: Mucous membranes are moist.      Pharynx: Oropharynx is clear. No oropharyngeal exudate or posterior oropharyngeal erythema.      Comments: No thrush  Eyes:      General: No scleral icterus.     Conjunctiva/sclera: Conjunctivae normal.      Pupils: Pupils are equal, round, and reactive to light.   Neck:      Comments: No JVD noted  Cardiovascular:      Rate and Rhythm: Normal rate and regular rhythm.      Heart sounds: Normal heart sounds. No murmur heard.     No friction rub. No gallop.   Pulmonary:      Effort: Pulmonary effort is normal. No respiratory distress.      Breath sounds: Normal breath sounds. No wheezing.   Abdominal:      General: Bowel sounds are normal. There is no distension.      Palpations: Abdomen is soft. There is no mass.      Tenderness: There is no abdominal tenderness. There is no guarding or rebound.      Hernia: No hernia is present.   Musculoskeletal:         General: No swelling or deformity. Normal range of motion.      Cervical back: Normal range of motion and neck supple.      Right lower leg: No edema.      Left lower leg: No edema.   Skin:     General: Skin is warm and dry.      Capillary Refill: Capillary refill takes less than 2 seconds.      Coloration: Skin is not jaundiced.      Findings: No rash.   Neurological:      General: No focal deficit present.      Mental Status: He is alert and oriented to person, place, and time. Mental status is at baseline.   Psychiatric:         Mood and Affect: Mood normal.         Behavior: Behavior normal.         Thought Content: Thought content normal.         Judgment: Judgment normal.     See media for image of cut.       Assessment and Plan     1. HIV disease  -     BIKTARVY -25 mg (25 kg or greater); Take 1 tablet by mouth once daily.  Dispense: 30 tablet; Refill: 3  -     CBC Auto  Differential; Future; Expected date: 06/12/2023  -     CD4 Lymphocytes  -     Comprehensive Metabolic Panel; Future; Expected date: 06/12/2023  -     HIV-1 RNA, Quantitative, PCR with Reflex to Genotype; Future; Expected date: 06/12/2023  -     Quantiferon Gold TB  -     Urinalysis, Reflex to Urine Culture  -     Ambulatory referral/consult to Ophthalmology; Future; Expected date: 06/19/2023  Last CD4 726 (32.8%) with viral suppression  Labs reviewed in detail with patient   Repeat labs today   Continue Biktarvy   Discussed HIV status at length to include need for 100% medication compliance and adherence, along with safe sex counseling performed.  Patient voiced understanding to both.  Will check labs today to include CD4, viral load, CBC and CMP.  Discussed importance of scheduled follow up as well.   Refer to ophthalmology clinic due to HIV disease/fundus photo   Return to clinic in 3 month with Marta LARA    2. Routine screening for STI (sexually transmitted infection)  -     C.trach/N.gonor AMP RNA  -     Chlamydia/GC, PCR  -     Hepatitis C Antibody; Future; Expected date: 06/12/2023  -     Hepatitis B Surface Antigen; Future; Expected date: 06/12/2023  -     SYPHILIS ANTIBODY (WITH REFLEX RPR)  Due for yearly STI screening to include GC/CT screening (urine / oral), along with screening for syphilis and hepatitis  Will do rectal GC/CT also on next visit     3. Cancer screening  Due for anal pap - pt requesting to defer until next visit    4. Vitamin D deficiency  -     Vitamin D  Last level 18.2   Is currently not on a vitamin-D supplement; has been off this medication x1 month   Repeat level today   Results will determine need/dosage of supplement    5. Alcohol use  -     Comprehensive Metabolic Panel; Future; Expected date: 06/12/2023  Patient admits to having 4-5 drinks on most days of wine and/or vodka.  Alcohol cessation encouraged    6. Need for vaccination  -     (In Office Administered) HPV Vaccine  (9-Valent) (3 Dose) (IM)  Due for Gardasil 9 #1 today        Rom Crump MD  \[facility]\"" Internal Medicine PGY-2      I spent a total of 36 minutes on the day of the visit.This includes face to face time and non-face to face time preparing to see the patient (eg, review of tests), obtaining and/or reviewing separately obtained history, documenting clinical information in the electronic or other health record, independently interpreting results and communicating results to the patient/family/caregiver, or care coordinator.         No follow-ups on file.

## 2024-01-23 NOTE — PROGRESS NOTES
[facility] Infectious Diseases Clinic Note    Subjective:      HPI    This is a 38 year old pleasant male with PMH HIV disease (last Cd4 780 (31.9%), VL undetected 6/2023) who presents to ID clinic today for follow up visit. The patient is currently taking Biktarvy and endorses 100% compliance.     He denies fever, chills, nausea, vomiting, diarrhea, cough, dizziness, vision changes, headache. He does have a small cut from a glass on his left ankle obtained 1-2 weeks ago,  however denies purulent drainage and fevers, he cleaned it with H2O2 and denies taking oral antibiotics. He was unable to see the ophthalmologist yet and has not seen a PCP yet but is requesting for a referral at [place] (where he currently lives) since he feels depressed since his  had an affair around November 2023. He is unsure if his  has been tested after the affair, he stated that he had sexual activity with his  after the affair around 1 month ago.       HIV history:  Diagnosed 2016 - CD4 and VL at diagnosis unknown  Risk factor - MSM  History OI - None  History STIs - None  Prior ART:   - Genvoya (2016)   - started on Biktarvy (2018)   -2019: viral load 1730, CD4 834  Genotype testing 2019:    (+) K103R  HLA- testing unknown  G6PD testing unknown    Health Screening:    The following items were screened for at today's visit:  [] Substance Abuse  [] Alcohol Abuse  [] Depression  [] Employment  [] Housing Situation/Homelessness  [] Travel  [] TB Exposure   [] Domestic Abuse   [] Smoking Cessation    Labs  HIV Viral Load: check q3-6 months    Results for orders placed or performed in visit on 06/12/23   HIV-1 RNA, Quantitative, PCR with Reflex to Genotype   Result Value Ref Range    HIV-1 RNA Detect/Quant, P Undetected Undetected copies/mL     CD4 Count: check q3-6 months  Results for orders placed or performed in visit on 06/12/23   CD4 Lymphocytes   Result Value Ref Range    Patient Age 37     WBC Absolute 5,110  4,500 - 11,500 /mm3    Lymph Percent 47.9 28 - 48 %    Lymph Absolute 2,447.69 1,260 - 5,520 x10(3)/mcL    CD4 % 31.9 %    CD4 Absolute 780.91567 589 - 1,505 unit/L    T Cell Interp       Normal absolute lymphocyte count with normal absolute CD4+ lymphocyte count.    Roger Briones M.D.        CBC with Differential: check q3-6 months  Lab Results   Component Value Date    WBC 5.11 06/12/2023    RBC 4.60 (L) 06/12/2023    HGB 13.8 (L) 06/12/2023    HCT 42.1 06/12/2023    MCV 91.5 06/12/2023    MCH 30.0 06/12/2023    MCHC 32.8 (L) 06/12/2023    RDW 13.6 06/12/2023     06/12/2023    MPV 10.4 06/12/2023     CMP: check q3-6 months  Lab Results   Component Value Date     06/12/2023    K 4.1 06/12/2023    GLUCOSE 98 06/12/2023    BUN 12.3 06/12/2023    CREATININE 0.88 06/12/2023    CALCIUM 9.9 06/12/2023    BILITOT 0.4 06/12/2023    AST 12 06/12/2023    ALT 12 06/12/2023    ALKPHOS 34 (L) 06/12/2023    ALBUMIN 4.2 06/12/2023     Quantiferon: check q1yr  Results for orders placed or performed in visit on 06/12/23   Quantiferon Gold TB   Result Value Ref Range    QuantiFERON-Tb Gold Plus Result Negative Negative    TB1 Ag minus Nil Result 0.00 IU/mL    TB2 Ag minus Nil Result 0.00 IU/mL    Mitogen minus Nil Result 8.94 IU/mL    Nil Result 0.02 IU/mL     Syphilis IgG: check q1yr, unless Hx syphilis then RPR titer q1yr  Results for orders placed or performed in visit on 06/12/23   SYPHILIS ANTIBODY (WITH REFLEX RPR)   Result Value Ref Range    Syphilis Antibody Nonreactive Nonreactive, Equivocal     No results found for this or any previous visit.  Toxoplasmosis IgG: check once  No results found for this or any previous visit.  Gonorrhea: check q1yr  Results for orders placed or performed in visit on 06/12/23   C.trach/N.gonor AMP RNA   Result Value Ref Range    Chlamydia trachomatis amplified RNA Negative Negative    Neisseria gonorrhoeae amplified RNA Negative Negative    Source SEE COMMENTS     SOURCE: SEE  COMMENTS      Lipid Panel: check q1yr  Lab Results   Component Value Date    TRIG 85 03/04/2021    CHOL 170 03/04/2021    HDL 59 03/04/2021    VLDL 17 03/04/2021     A1C: check q1yr  Results for orders placed or performed in visit on 04/26/17   Hemoglobin A1C   Result Value Ref Range    Estimated Average Glucose 108 <<=115 mg/dL    Hemoglobin A1c 5.4 4.7 - 5.6 %     Urinalysis: check q1yr, check q6mo if taking tenofovir  Lab Results   Component Value Date    PROTEINUA Negative 06/12/2023    UROBILINOGEN Normal 06/12/2023    KETONESU Negative 01/20/2022    NITRITE Negative 01/20/2022    LEUKOCYTESUR Negative 06/12/2023    COLORU Light-Yellow 01/20/2022    RBCUA 0-5 06/12/2023     Glucose-6 Phosphate Dehydrogenase: check once  No results found for this or any previous visit.  Hepatitis A IgG: check once  Results for orders placed or performed in visit on 11/15/22   Hepatitis A antibody, IgG   Result Value Ref Range    Hep A IGG Reactive (A) Nonreactive     Hepatitis B Surface Antibody: check once  Results for orders placed or performed in visit on 11/15/22   Hepatitis B Surface Ab, Qualitative   Result Value Ref Range    Hepatitis B Surface Antibody Reactive (A) Nonreactive    Hepatitis B Surface Antibody Qnt >25,000.00 mIU/mL     Hepatitis C Antibody: check q1yr if high risk, once if low risk  Results for orders placed or performed in visit on 06/12/23   Hepatitis C Antibody   Result Value Ref Range    Hep C Ab Interp Nonreactive Nonreactive         Vaccines  Pneumovax-23: if CD4>200 give twice q5yrs apart before age 65, then once at 65, give >8wks from last Prevnar  Prevnar-13: if CD4>200 give once, give >1yr from last Pneumovax-23  Influenza vaccine: q1yr  HPV: Gardasil-9 at 0, 2, and 6 months for ages 15-45  Tdap: if age >35 give Td q10yrs, replace with Tdap once  MenACWY: 2 dose primary series 8 weeks apart, then 1 dose booster q5yr  Shingrix: All HIV above >19 y/o receive 2 dose series, timed 4 weeks to 6 months  apart  Immunization History   Administered Date(s) Administered    COVID-19, MRNA, LN-S, PF (Pfizer) (Purple Cap) 03/10/2021, 03/31/2021    DTP 1987, 02/01/1988, 09/01/1988, 02/13/1989, 09/01/1992    HIB 07/26/1990    HPV 9-Valent 06/12/2023    Hepatitis A / Hepatitis B 03/27/2019, 07/17/2019    Hepatitis B 03/12/1998    Hepatitis B, Pediatric/Adolescent 11/06/1997, 12/11/1997, 09/13/2004, 05/17/2005    Influenza - Quadrivalent 09/28/2016, 01/20/2022    Influenza - Quadrivalent - PF *Preferred* (6 months and older) 11/19/2019    Influenza - Trivalent - PF (ADULT) 10/09/2015, 11/19/2019    MMR 02/13/1989, 09/01/1992    Meningococcal Conjugate (MCV4P) 01/23/2008    OPV 1987, 02/01/1988, 02/13/1989, 09/01/1992    Pneumococcal Conjugate - 13 Valent 02/20/2015    Pneumococcal Polysaccharide - 23 Valent 07/16/2015, 01/20/2022    Td - PF (ADULT) 12/11/1997, 09/13/2004    Tdap 06/11/2020    Varicella 09/13/2004, 05/17/2005    meningococcal Conjugate (MCV4O) 03/27/2019, 07/17/2019       Imaging  DEXA Scan: if age>50, check q10yrs  No results found for this or any previous visit.  Colonguard: check q1yr  No results found for this or any previous visit.  Abdominal Ultrasound: if age 65-75 and male and ever smoked check once, not needed if had CT abdomen  Mammogram: if female and age 50-75 check q2yrs  No results found for this or any previous visit.    Pathology:  Pap Smear: if female and age >21 check cervical pap q1yr, if male and MSM check rectal pap q1yr  No results found for this or any previous visit.    ASCVD Risk Score:  Consider high-intensity statin therapy if 10-year ASCVD risk score >7.5%  The ASCVD Risk score (Kori DK, et al., 2019) failed to calculate for the following reasons:    The 2019 ASCVD risk score is only valid for ages 40 to 79    Review of Systems   Constitutional:  Negative for chills and fever.   Respiratory:  Negative for cough and hemoptysis.    Cardiovascular:  Negative for chest  "pain and palpitations.   Gastrointestinal:  Negative for abdominal pain, diarrhea, nausea and vomiting.   Skin:  Negative for rash.   Neurological:  Negative for dizziness, tingling and headaches.       The following portions of the patient's history were reviewed and updated as appropriate: allergies, current medications, past family history, past medical history, past social history, past surgical history and problem list.    Objective:   Vitals:  /82 (BP Location: Right arm, Patient Position: Sitting, BP Method: Medium (Automatic))   Pulse 73   Temp 98 °F (36.7 °C)   Resp 17   Ht 5' 9" (1.753 m)   Wt 86.2 kg (190 lb)   BMI 28.06 kg/m²  Body mass index is 28.06 kg/m².    Gen: awake, alert, NAD  HEENT: NC/AT, EOMi, anicteric sclera, no rhinorrhea, OP clear  Neck: no cervical LAD  CV: regular rate normal rhythm no murmur  Resp: easy WOB on RA CTABL no w/r/r  Abd: +BS, soft, NTTP, no HSM  Ext: warm, no LE edema  Skin: no rash  Neuro: no focal deficits   See media for pictured wound cut from glass    Assessment      1) HIV disease, most recent CD4 780 (31.9%) and VL undetected on 6/2023.  2) Vitamin D deficiency    Plan    - Order repeat labs today (CBC, CMP, Cd4, VL)  - Ordering Syphilis, UA, GC/chlamydia urine NAAT  - Checking yearly quantiferon gold. Next due: 6/2024  - Continue Biktarvy. Refills given  - Educated on importance of 100% medication compliance  - Educated on need for barrier contraception   - Reviewed vaccinations today. Due for Flu, he would like to defer it before end of flu season.  - Due for rectal pap. He would like to defer this to next visit.  - Needs PCP referral Will place order.  - Needs ophthalmology referral again.  - Given information about Cabenuva, if he would like to switch to this he will let us know.    RTC 3 months      Rom Crump MD  [employer] [facility] PGY-2      "

## 2024-01-24 LAB
C TRACH RRNA SPEC QL NAA+PROBE: NORMAL
N GONORRHOEA RRNA SPEC QL NAA+PROBE: NORMAL

## 2024-03-26 ENCOUNTER — DOCUMENTATION ONLY (OUTPATIENT)
Dept: INTERNAL MEDICINE | Facility: CLINIC | Age: 39
End: 2024-03-26
Payer: MEDICAID

## 2024-03-26 NOTE — PROGRESS NOTES
Received note from ophthalmology clinic 3/26/24:    Day Waters Nadjel, MD  Thank you for referring your patient.    We have made several attempts to contact him/her with no success. A letter has been sent to the patient requesting to give us a call if our service is still needed and the referral will be valid for one year.    Our goal is to offer the best possible care to our patients.  Again, Thank you for the referral!

## 2024-04-23 ENCOUNTER — OFFICE VISIT (OUTPATIENT)
Dept: INFECTIOUS DISEASES | Facility: CLINIC | Age: 39
End: 2024-04-23
Payer: MEDICAID

## 2024-04-23 VITALS
TEMPERATURE: 98 F | DIASTOLIC BLOOD PRESSURE: 70 MMHG | HEIGHT: 69 IN | SYSTOLIC BLOOD PRESSURE: 106 MMHG | WEIGHT: 199.06 LBS | HEART RATE: 66 BPM | BODY MASS INDEX: 29.48 KG/M2

## 2024-04-23 DIAGNOSIS — Z12.9 CANCER SCREENING: ICD-10-CM

## 2024-04-23 DIAGNOSIS — B20 HIV DISEASE: Primary | ICD-10-CM

## 2024-04-23 DIAGNOSIS — E55.9 VITAMIN D DEFICIENCY: ICD-10-CM

## 2024-04-23 DIAGNOSIS — Z23 NEED FOR VACCINATION: ICD-10-CM

## 2024-04-23 DIAGNOSIS — Z11.3 ROUTINE SCREENING FOR STI (SEXUALLY TRANSMITTED INFECTION): ICD-10-CM

## 2024-04-23 LAB
ALBUMIN SERPL-MCNC: 4.2 G/DL (ref 3.5–5)
ALBUMIN/GLOB SERPL: 1.2 RATIO (ref 1.1–2)
ALP SERPL-CCNC: 35 UNIT/L (ref 40–150)
ALT SERPL-CCNC: 14 UNIT/L (ref 0–55)
APPEARANCE UR: CLEAR
AST SERPL-CCNC: 15 UNIT/L (ref 5–34)
BACTERIA #/AREA URNS AUTO: ABNORMAL /HPF
BASOPHILS # BLD AUTO: 0.04 X10(3)/MCL
BASOPHILS NFR BLD AUTO: 0.6 %
BILIRUB SERPL-MCNC: 0.4 MG/DL
BILIRUB UR QL STRIP.AUTO: NEGATIVE
BUN SERPL-MCNC: 18.7 MG/DL (ref 8.9–20.6)
C TRACH DNA SPEC QL NAA+PROBE: NOT DETECTED
C TRACH DNA SPEC QL NAA+PROBE: NOT DETECTED
CALCIUM SERPL-MCNC: 9.9 MG/DL (ref 8.4–10.2)
CHLORIDE SERPL-SCNC: 104 MMOL/L (ref 98–107)
CO2 SERPL-SCNC: 28 MMOL/L (ref 22–29)
COLOR UR AUTO: COLORLESS
CREAT SERPL-MCNC: 0.89 MG/DL (ref 0.73–1.18)
DEPRECATED CALCIDIOL+CALCIFEROL SERPL-MC: 30.7 NG/ML (ref 30–80)
EOSINOPHIL # BLD AUTO: 0.01 X10(3)/MCL (ref 0–0.9)
EOSINOPHIL NFR BLD AUTO: 0.1 %
ERYTHROCYTE [DISTWIDTH] IN BLOOD BY AUTOMATED COUNT: 13.2 % (ref 11.5–17)
GFR SERPLBLD CREATININE-BSD FMLA CKD-EPI: >60 MLS/MIN/1.73/M2
GLOBULIN SER-MCNC: 3.5 GM/DL (ref 2.4–3.5)
GLUCOSE SERPL-MCNC: 91 MG/DL (ref 74–100)
GLUCOSE UR QL STRIP.AUTO: NORMAL
HBV CORE AB SERPL QL IA: NONREACTIVE
HBV SURFACE AB SER-ACNC: ABNORMAL MIU/ML
HBV SURFACE AB SERPL IA-ACNC: REACTIVE M[IU]/ML
HBV SURFACE AG SERPL QL IA: NONREACTIVE
HCT VFR BLD AUTO: 42 % (ref 42–52)
HCV AB SERPL QL IA: NONREACTIVE
HGB BLD-MCNC: 14 G/DL (ref 14–18)
HYALINE CASTS #/AREA URNS LPF: ABNORMAL /LPF
IMM GRANULOCYTES # BLD AUTO: 0.01 X10(3)/MCL (ref 0–0.04)
IMM GRANULOCYTES NFR BLD AUTO: 0.1 %
KETONES UR QL STRIP.AUTO: NEGATIVE
LEUKOCYTE ESTERASE UR QL STRIP.AUTO: NEGATIVE
LYMPHOCYTES # BLD AUTO: 2.08 X10(3)/MCL (ref 0.6–4.6)
LYMPHOCYTES NFR BLD AUTO: 28.8 %
MCH RBC QN AUTO: 30.4 PG (ref 27–31)
MCHC RBC AUTO-ENTMCNC: 33.3 G/DL (ref 33–36)
MCV RBC AUTO: 91.3 FL (ref 80–94)
MONOCYTES # BLD AUTO: 0.24 X10(3)/MCL (ref 0.1–1.3)
MONOCYTES NFR BLD AUTO: 3.3 %
N GONORRHOEA DNA SPEC QL NAA+PROBE: NOT DETECTED
N GONORRHOEA DNA SPEC QL NAA+PROBE: NOT DETECTED
NEUTROPHILS # BLD AUTO: 4.85 X10(3)/MCL (ref 2.1–9.2)
NEUTROPHILS NFR BLD AUTO: 67.1 %
NITRITE UR QL STRIP.AUTO: NEGATIVE
NRBC BLD AUTO-RTO: 0 %
PH UR STRIP.AUTO: 6.5 [PH]
PLATELET # BLD AUTO: 265 X10(3)/MCL (ref 130–400)
PMV BLD AUTO: 9.9 FL (ref 7.4–10.4)
POTASSIUM SERPL-SCNC: 3.7 MMOL/L (ref 3.5–5.1)
PROT SERPL-MCNC: 7.7 GM/DL (ref 6.4–8.3)
PROT UR QL STRIP.AUTO: NEGATIVE
RBC # BLD AUTO: 4.6 X10(6)/MCL (ref 4.7–6.1)
RBC #/AREA URNS AUTO: ABNORMAL /HPF
RBC UR QL AUTO: NEGATIVE
SODIUM SERPL-SCNC: 138 MMOL/L (ref 136–145)
SOURCE (OHS): NORMAL
SOURCE (OHS): NORMAL
SP GR UR STRIP.AUTO: 1.02 (ref 1–1.03)
SQUAMOUS #/AREA URNS LPF: ABNORMAL /HPF
T PALLIDUM AB SER QL: NONREACTIVE
UROBILINOGEN UR STRIP-ACNC: NORMAL
WBC # SPEC AUTO: 7.23 X10(3)/MCL (ref 4.5–11.5)
WBC #/AREA URNS AUTO: ABNORMAL /HPF

## 2024-04-23 PROCEDURE — 87591 N.GONORRHOEAE DNA AMP PROB: CPT

## 2024-04-23 PROCEDURE — 80053 COMPREHEN METABOLIC PANEL: CPT

## 2024-04-23 PROCEDURE — 86780 TREPONEMA PALLIDUM: CPT

## 2024-04-23 PROCEDURE — 99213 OFFICE O/P EST LOW 20 MIN: CPT | Mod: PBBFAC

## 2024-04-23 PROCEDURE — 81001 URINALYSIS AUTO W/SCOPE: CPT

## 2024-04-23 PROCEDURE — 86803 HEPATITIS C AB TEST: CPT

## 2024-04-23 PROCEDURE — 90471 IMMUNIZATION ADMIN: CPT | Mod: PBBFAC

## 2024-04-23 PROCEDURE — 87340 HEPATITIS B SURFACE AG IA: CPT

## 2024-04-23 PROCEDURE — 86706 HEP B SURFACE ANTIBODY: CPT

## 2024-04-23 PROCEDURE — 85025 COMPLETE CBC W/AUTO DIFF WBC: CPT

## 2024-04-23 PROCEDURE — 86480 TB TEST CELL IMMUN MEASURE: CPT

## 2024-04-23 PROCEDURE — 86360 T CELL ABSOLUTE COUNT/RATIO: CPT

## 2024-04-23 PROCEDURE — 36415 COLL VENOUS BLD VENIPUNCTURE: CPT

## 2024-04-23 PROCEDURE — 87536 HIV-1 QUANT&REVRSE TRNSCRPJ: CPT

## 2024-04-23 PROCEDURE — 82306 VITAMIN D 25 HYDROXY: CPT

## 2024-04-23 PROCEDURE — 88112 CYTOPATH CELL ENHANCE TECH: CPT

## 2024-04-23 PROCEDURE — 90651 9VHPV VACCINE 2/3 DOSE IM: CPT | Mod: PBBFAC

## 2024-04-23 PROCEDURE — 86704 HEP B CORE ANTIBODY TOTAL: CPT

## 2024-04-23 RX ORDER — ACETAMINOPHEN 500 MG
4000 TABLET ORAL DAILY
Qty: 60 CAPSULE | Refills: 2 | Status: SHIPPED | OUTPATIENT
Start: 2024-04-23 | End: 2024-07-22

## 2024-04-23 RX ORDER — BICTEGRAVIR SODIUM, EMTRICITABINE, AND TENOFOVIR ALAFENAMIDE FUMARATE 50; 200; 25 MG/1; MG/1; MG/1
1 TABLET ORAL DAILY
Qty: 90 TABLET | Refills: 1 | Status: SHIPPED | OUTPATIENT
Start: 2024-04-23

## 2024-04-23 RX ADMIN — HUMAN PAPILLOMAVIRUS 9-VALENT VACCINE, RECOMBINANT 0.5 ML: 30; 40; 60; 40; 20; 20; 20; 20; 20 INJECTION, SUSPENSION INTRAMUSCULAR at 11:04

## 2024-04-23 NOTE — PROGRESS NOTES
[facility] Infectious Diseases Clinic Note    Subjective:      MICAH Sweet is a 37 y/o M with PMH HIV disease (last Cd4 780 (31.9%), VL undetected 6/2023 ) who presents to ID clinic today for follow up visit. The patient is currently taking Biktarvy and endorses 100% compliance.  He denies fever, chills, nausea, vomiting, diarrhea, cough, dizziness, vision changes, headache. Denies any STI related symptoms including discharge, burning with urination, lesions. Sexually active with , concern for affair in 11/2023. Previous STI testing negative. Received first dose of 3-part Gardasil-9 in 6/2023, would like to restart series at this visit.     HIV history:  Diagnosed 2016 - CD4 and VL at diagnosis unknown  Risk factor - MSM  History OI - None  History STIs - None  Prior ART:                 - Genvoya (2016)                 - started on Biktarvy (2018)                 -2019: viral load 1730, CD4 834  Genotype testing 2019:                 (+) K103R  HLA- testing unknown  G6PD testing unknown      Labs  HIV Viral Load: check q3-6 months  HIV VL: Undetectable, Date: 6/2023      CD4 Count: check q3-6 months  Lab Results   Component Value Date    WBCABSOLUTE 5,110 06/12/2023    LYMPHPERCENT 47.9 06/12/2023    LYMPHABSOLUT 2,447.69 06/12/2023    [id number] 780.67874 06/12/2023    CD4PCT 31.9 06/12/2023    TCELLINTERP  06/12/2023     Normal absolute lymphocyte count with normal absolute CD4+ lymphocyte count.    Roger Briones M.D.        CBC with Differential: check q3-6 months  Lab Results   Component Value Date    WBC 5.01 01/23/2024    RBC 4.42 (L) 01/23/2024    HGB 13.9 (L) 01/23/2024    HCT 40.9 (L) 01/23/2024    MCV 92.5 01/23/2024    MCH 31.4 (H) 01/23/2024    MCHC 34.0 01/23/2024    RDW 12.9 01/23/2024     01/23/2024    MPV 9.7 01/23/2024     CMP: check q3-6 months  Lab Results   Component Value Date     01/23/2024    K 3.8 01/23/2024    GLUCOSE 100 01/23/2024    BUN 15.8 01/23/2024  "   CREATININE 0.97 01/23/2024    CALCIUM 9.5 01/23/2024    BILITOT 0.4 01/23/2024    AST 12 01/23/2024    ALT 11 01/23/2024    ALKPHOS 32 (L) 01/23/2024    ALBUMIN 4.0 01/23/2024     Quantiferon: check q1yr  TB Gold: Not Detected, Date: 6/2023    Syphilis IgG: check q1yr, unless Hx syphilis then RPR titer q1yr  Lab Results   Component Value Date    SYPHAB Nonreactive 01/23/2024    LABRPR Non-Reactive 04/26/2017     Toxoplasmosis IgG: check once  No results found for: "TXIGMS", "TOXIGGS", "TOXIGGVL"  Gonorrhea: check q1yr  Lab Results   Component Value Date    NGONNO Not Detected 01/23/2024    NGONNO Not Detected 01/23/2024    LABCHLAPCR Not Detected 01/23/2024    LABCHLAPCR Not Detected 01/23/2024     Lipid Panel: check q1yr  Lab Results   Component Value Date    TRIG 85 03/04/2021    CHOL 170 03/04/2021    HDL 59 03/04/2021    VLDL 17 03/04/2021     A1C: check q1yr  Lab Results   Component Value Date    HGBA1C 5.4 04/26/2017     Urinalysis: check q1yr, check q6mo if taking tenofovir  Lab Results   Component Value Date    PROTEINUA Negative 01/23/2024    UROBILINOGEN Normal 01/23/2024    KETONESU Negative 01/20/2022    NITRITE Negative 01/20/2022    LEUKOCYTESUR Negative 01/23/2024    COLORU Light-Yellow 01/20/2022    RBCUA 0-5 01/23/2024     Glucose-6 Phosphate Dehydrogenase: check once  No results found for: "D3XMUPE"  Hepatitis A IgG: check once  Lab Results   Component Value Date    HEPAIGG Reactive (A) 11/15/2022     Hepatitis B Surface Antibody: check once  Lab Results   Component Value Date    HEPBSAB Reactive (A) 11/15/2022    HBSABQUANT >25,000.00 11/15/2022     Hepatitis C Antibody: check q1yr if high risk, once if low risk  Lab Results   Component Value Date    HEPCAB Nonreactive 06/12/2023         Vaccines  Pneumovax-23: if CD4>200 give twice q5yrs apart before age 65, then once at 65, give >8wks from last Prevnar  Prevnar-13: if CD4>200 give once, give >1yr from last Pneumovax-23  Influenza vaccine: " "q1yr  HPV: Gardasil-9 at 0, 2, and 6 months for ages 15-45  Tdap: if age >35 give Td q10yrs, replace with Tdap once  MenACWY: 2 dose primary series 8 weeks apart, then 1 dose booster q5yr  Shingrix: All HIV above >17 y/o receive 2 dose series, timed 4 weeks to 6 months apart  Immunization History   Administered Date(s) Administered    COVID-19, MRNA, LN-S, PF (Pfizer) (Purple Cap) 03/10/2021, 03/31/2021    DTP 1987, 02/01/1988, 09/01/1988, 02/13/1989, 09/01/1992    HIB 07/26/1990    HPV 9-Valent 06/12/2023, 04/23/2024    Hepatitis A / Hepatitis B 03/27/2019, 07/17/2019    Hepatitis B 03/12/1998    Hepatitis B, Pediatric/Adolescent 11/06/1997, 12/11/1997, 09/13/2004, 05/17/2005    Influenza - Quadrivalent 09/28/2016, 01/20/2022    Influenza - Quadrivalent - PF *Preferred* (6 months and older) 11/19/2019    Influenza - Trivalent - PF (ADULT) 10/09/2015, 11/19/2019    MMR 02/13/1989, 09/01/1992    Meningococcal Conjugate (MCV4O) 2 Vial (2mo-55yr) 03/27/2019, 07/17/2019    Meningococcal Conjugate (MCV4P) 01/23/2008    OPV 1987, 02/01/1988, 02/13/1989, 09/01/1992    Pneumococcal Conjugate - 13 Valent 02/20/2015    Pneumococcal Polysaccharide - 23 Valent 07/16/2015, 01/20/2022    Td - PF (ADULT) 12/11/1997, 09/13/2004    Tdap 06/11/2020    Varicella 09/13/2004, 05/17/2005       Imaging  DEXA Scan: if age>50, check q10yrs  No results found for this or any previous visit.    Pathology:  Pap Smear: if female and age >21 check cervical pap q1yr, if male and MSM check rectal pap q1yr  No results found for: "PAP"    ASCVD Risk Score:  Consider high-intensity statin therapy if 10-year ASCVD risk score >7.5%  The ASCVD Risk score (Kori DK, et al., 2019) failed to calculate for the following reasons:    The 2019 ASCVD risk score is only valid for ages 40 to 79    Review of Systems   Constitutional: Negative.    HENT: Negative.     Eyes: Negative.    Respiratory: Negative.     Cardiovascular: Negative.  " "  Gastrointestinal: Negative.    Genitourinary: Negative.    Musculoskeletal: Negative.    Skin: Negative.    Neurological: Negative.    Endo/Heme/Allergies: Negative.        The following portions of the patient's history were reviewed and updated as appropriate: allergies, current medications, past family history, past medical history, past social history, past surgical history and problem list.    Objective:   Vitals:  /70 (BP Location: Left arm, Patient Position: Sitting, BP Method: Large (Automatic))   Pulse 66   Temp 98.3 °F (36.8 °C) (Oral)   Ht 5' 9" (1.753 m)   Wt 90.3 kg (199 lb 1.2 oz)   BMI 29.40 kg/m²  Body mass index is 29.4 kg/m².    Gen: awake, alert, NAD  HEENT: NC/AT, EOMi, anicteric sclera, no rhinorrhea, OP clear  Neck: no cervical LAD  CV: regular rate normal rhythm no murmur  Resp: easy WOB on RA CTABL no w/r/r  Abd: +BS, soft, NTTP, no HSM  Ext: warm, no LE edema  Skin: no rash  Neuro: no focal deficits       Assessment      1) HIV disease, controlled on ART. Most recent CD4 780 (31.9%) and VL undetected on 6/2023.   2) Need for STI screen today  3) Need for HPV vaccination  4) Healthcare maintenance   5) Vit D def    Plan    - Order repeat labs today (CBC, CMP, Cd4, VL)  - Pt interested in Cabenuva, will complete Hep B workup to rule out chronic HBV. Continue Biktarvy for now. Refills given. Can transition to Cabenuva once chronic HBV is ruled out and viral load remains suppressed  - Ordering Syphilis, UA, GC/chlamydia urine NAAT  - Anal PAP: due today, pt will perform self-swab  - Checking yearly quantiferon gold. Next due: today  - Educated on importance of 100% medication compliance  - Reviewed vaccinations today. Due for HPV vaccination. Will restart Gardasil-9 HPV vaccine series today, part 1 of 3 4/23/24    RTC 3 months    Cole Hankins MD  Infectious Diseases Faculty   of Medicine      "

## 2024-04-23 NOTE — PROGRESS NOTES
[facility] Infectious Diseases Clinic Note    Subjective:      MICAH Sweet is a 39 y/o M with PMH HIV disease (last Cd4 780 (31.9%), VL undetected 6/2023 ) who presents to ID clinic today for follow up visit. The patient is currently taking Biktarvy and endorses 100% compliance.  He denies fever, chills, nausea, vomiting, diarrhea, cough, dizziness, vision changes, headache. Denies any STI related symptoms including discharge, burning with urination, lesions. Sexually active with , concern for affair in 11/2023. Previous STI testing negative. Received first dose of 3-part Gardasil-9 in 6/2023, would like to restart series at this visit.     HIV history:  Diagnosed 2016 - CD4 and VL at diagnosis unknown  Risk factor - MSM  History OI - None  History STIs - None  Prior ART:                 - Genvoya (2016)                 - started on Biktarvy (2018)                 -2019: viral load 1730, CD4 834  Genotype testing 2019:                 (+) K103R  HLA- testing unknown  G6PD testing unknown    Health Screening:    The following items were screened for at today's visit:  [] Substance Abuse  [] Alcohol Abuse  [] Depression  [] Employment  [] Housing Situation/Homelessness  [] Travel  [] TB Exposure   [] Domestic Abuse   [] Smoking Cessation    Labs  HIV Viral Load: check q3-6 months  HIV VL: Undetectable, Date: 6/2023      CD4 Count: check q3-6 months  Lab Results   Component Value Date    WBCABSOLUTE 5,110 06/12/2023    LYMPHPERCENT 47.9 06/12/2023    LYMPHABSOLUT 2,447.69 06/12/2023    [id number] 780.62413 06/12/2023    CD4PCT 31.9 06/12/2023    TCELLINTERP  06/12/2023     Normal absolute lymphocyte count with normal absolute CD4+ lymphocyte count.    Roger Briones M.D.        CBC with Differential: check q3-6 months  Lab Results   Component Value Date    WBC 5.01 01/23/2024    RBC 4.42 (L) 01/23/2024    HGB 13.9 (L) 01/23/2024    HCT 40.9 (L) 01/23/2024    MCV 92.5 01/23/2024    MCH 31.4 (H)  "01/23/2024    MCHC 34.0 01/23/2024    RDW 12.9 01/23/2024     01/23/2024    MPV 9.7 01/23/2024     CMP: check q3-6 months  Lab Results   Component Value Date     01/23/2024    K 3.8 01/23/2024    GLUCOSE 100 01/23/2024    BUN 15.8 01/23/2024    CREATININE 0.97 01/23/2024    CALCIUM 9.5 01/23/2024    BILITOT 0.4 01/23/2024    AST 12 01/23/2024    ALT 11 01/23/2024    ALKPHOS 32 (L) 01/23/2024    ALBUMIN 4.0 01/23/2024     Quantiferon: check q1yr  TB Gold: Not Detected, Date: 6/2023    Syphilis IgG: check q1yr, unless Hx syphilis then RPR titer q1yr  Lab Results   Component Value Date    SYPHAB Nonreactive 01/23/2024    LABRPR Non-Reactive 04/26/2017     Toxoplasmosis IgG: check once  No results found for: "TXIGMS", "TOXIGGS", "TOXIGGVL"  Gonorrhea: check q1yr  Lab Results   Component Value Date    NGONNO Not Detected 01/23/2024    NGONNO Not Detected 01/23/2024    LABCHLAPCR Not Detected 01/23/2024    LABCHLAPCR Not Detected 01/23/2024     Lipid Panel: check q1yr  Lab Results   Component Value Date    TRIG 85 03/04/2021    CHOL 170 03/04/2021    HDL 59 03/04/2021    VLDL 17 03/04/2021     A1C: check q1yr  Lab Results   Component Value Date    HGBA1C 5.4 04/26/2017     Urinalysis: check q1yr, check q6mo if taking tenofovir  Lab Results   Component Value Date    PROTEINUA Negative 01/23/2024    UROBILINOGEN Normal 01/23/2024    KETONESU Negative 01/20/2022    NITRITE Negative 01/20/2022    LEUKOCYTESUR Negative 01/23/2024    COLORU Light-Yellow 01/20/2022    RBCUA 0-5 01/23/2024     Glucose-6 Phosphate Dehydrogenase: check once  No results found for: "G1GZGFF"  Hepatitis A IgG: check once  Lab Results   Component Value Date    HEPAIGG Reactive (A) 11/15/2022     Hepatitis B Surface Antibody: check once  Lab Results   Component Value Date    HEPBSAB Reactive (A) 11/15/2022    HBSABQUANT >25,000.00 11/15/2022     Hepatitis C Antibody: check q1yr if high risk, once if low risk  Lab Results   Component Value " "Date    HEPCAB Nonreactive 06/12/2023         Vaccines  Pneumovax-23: if CD4>200 give twice q5yrs apart before age 65, then once at 65, give >8wks from last Prevnar  Prevnar-13: if CD4>200 give once, give >1yr from last Pneumovax-23  Influenza vaccine: q1yr  HPV: Gardasil-9 at 0, 2, and 6 months for ages 15-45  Tdap: if age >35 give Td q10yrs, replace with Tdap once  MenACWY: 2 dose primary series 8 weeks apart, then 1 dose booster q5yr  Shingrix: All HIV above >19 y/o receive 2 dose series, timed 4 weeks to 6 months apart  Immunization History   Administered Date(s) Administered    COVID-19, MRNA, LN-S, PF (Pfizer) (Purple Cap) 03/10/2021, 03/31/2021    DTP 1987, 02/01/1988, 09/01/1988, 02/13/1989, 09/01/1992    HIB 07/26/1990    HPV 9-Valent 06/12/2023    Hepatitis A / Hepatitis B 03/27/2019, 07/17/2019    Hepatitis B 03/12/1998    Hepatitis B, Pediatric/Adolescent 11/06/1997, 12/11/1997, 09/13/2004, 05/17/2005    Influenza - Quadrivalent 09/28/2016, 01/20/2022    Influenza - Quadrivalent - PF *Preferred* (6 months and older) 11/19/2019    Influenza - Trivalent - PF (ADULT) 10/09/2015, 11/19/2019    MMR 02/13/1989, 09/01/1992    Meningococcal Conjugate (MCV4O) 2 Vial (2mo-55yr) 03/27/2019, 07/17/2019    Meningococcal Conjugate (MCV4P) 01/23/2008    OPV 1987, 02/01/1988, 02/13/1989, 09/01/1992    Pneumococcal Conjugate - 13 Valent 02/20/2015    Pneumococcal Polysaccharide - 23 Valent 07/16/2015, 01/20/2022    Td - PF (ADULT) 12/11/1997, 09/13/2004    Tdap 06/11/2020    Varicella 09/13/2004, 05/17/2005       Imaging  DEXA Scan: if age>50, check q10yrs  No results found for this or any previous visit.    Pathology:  Pap Smear: if female and age >21 check cervical pap q1yr, if male and MSM check rectal pap q1yr  No results found for: "PAP"    ASCVD Risk Score:  Consider high-intensity statin therapy if 10-year ASCVD risk score >7.5%  The ASCVD Risk score (Kori VALLE, et al., 2019) failed to calculate for " "the following reasons:    The 2019 ASCVD risk score is only valid for ages 40 to 79    Review of Systems   Constitutional: Negative.    HENT: Negative.     Eyes: Negative.    Respiratory: Negative.     Cardiovascular: Negative.    Gastrointestinal: Negative.    Genitourinary: Negative.    Musculoskeletal: Negative.    Skin: Negative.    Neurological: Negative.    Endo/Heme/Allergies: Negative.      The following portions of the patient's history were reviewed and updated as appropriate: allergies, current medications, past family history, past medical history, past social history, past surgical history and problem list.    Objective:   Vitals:  /70 (BP Location: Left arm, Patient Position: Sitting, BP Method: Large (Automatic))   Pulse 66   Temp 98.3 °F (36.8 °C) (Oral)   Ht 5' 9" (1.753 m)   Wt 90.3 kg (199 lb 1.2 oz)   BMI 29.40 kg/m²  Body mass index is 29.4 kg/m².    Gen: awake, alert, NAD  HEENT: NC/AT, EOMi, anicteric sclera, no rhinorrhea, OP clear  Neck: no cervical LAD  CV: regular rate normal rhythm no murmur  Resp: easy WOB on RA CTABL no w/r/r  Abd: +BS, soft, NTTP, no HSM  Ext: warm, no LE edema  Skin: no rash  Neuro: no focal deficits       Assessment      1) HIV disease, controlled on ART. Most recent CD4 780 (31.9%) and VL undetected on 6/2023.   3) Need for STI screen today  4) Need for HPV vaccination  5) Healthcare maintenance     Plan    - Order repeat labs today (CBC, CMP, Cd4, VL)  - Pt interested in Cabenuva, will complete Hep B workup. Continue Biktarvy for now. Refills given. Will transition at next appointment if pt meets criteria and still interested  - Ordering Syphilis, UA, GC/chlamydia urine NAAT  - Anal PAP: due today, pt will perform self-swab  - Checking yearly quantiferon gold. Next due: today  - Educated on importance of 100% medication compliance  - Reviewed vaccinations today. Due for HPV vaccination. Will restart Gardasil-9 HPV vaccine series today, part 1 of 3 " 4/23/24    RTC 3 months    Ender Aranda   MS3

## 2024-04-24 LAB
C TRACH RRNA SPEC QL NAA+PROBE: NEGATIVE
CD3 CELLS # BLD: 1626 CELLS/MCL (ref 550–2202)
CD3 CELLS NFR BLD: 94 % (ref 58–86)
CD3+CD4+ CELLS # BLD: 636 CELLS/MCL (ref 365–1437)
CD3+CD4+ CELLS NFR BLD: 37 % (ref 32–64)
CD3+CD4+ CELLS/CD3+CD8+ CLL BLD: 0.7 %
CD3+CD8+ CELLS # BLD: 982 CELLS/MCL (ref 171–846)
CD3+CD8+ CELLS NFR BLD: 57 % (ref 15–40)
CD45 CELLS # BLD: 1.72 THOU/MCL (ref 0.82–2.84)
N GONORRHOEA RRNA SPEC QL NAA+PROBE: NEGATIVE
PSYCHE PATHOLOGY RESULT: NORMAL
SPECIMEN SOURCE: NORMAL
SPECIMEN SOURCE: NORMAL

## 2024-04-25 LAB
GAMMA INTERFERON BACKGROUND BLD IA-ACNC: 0.02 IU/ML
HIV1 RNA # PLAS NAA DL=20: NORMAL COPIES/ML
M TB IFN-G BLD-IMP: NEGATIVE
M TB IFN-G CD4+ BCKGRND COR BLD-ACNC: -0.01 IU/ML
M TB IFN-G CD4+CD8+ BCKGRND COR BLD-ACNC: -0.01 IU/ML
MITOGEN IGNF BCKGRD COR BLD-ACNC: 7.37 IU/ML

## 2024-07-24 ENCOUNTER — OFFICE VISIT (OUTPATIENT)
Dept: INFECTIOUS DISEASES | Facility: CLINIC | Age: 39
End: 2024-07-24
Payer: MEDICAID

## 2024-07-24 ENCOUNTER — OFFICE VISIT (OUTPATIENT)
Dept: FAMILY MEDICINE | Facility: CLINIC | Age: 39
End: 2024-07-24
Payer: MEDICAID

## 2024-07-24 VITALS
HEART RATE: 69 BPM | TEMPERATURE: 99 F | BODY MASS INDEX: 29.6 KG/M2 | HEIGHT: 69 IN | RESPIRATION RATE: 20 BRPM | OXYGEN SATURATION: 100 % | WEIGHT: 199.88 LBS | DIASTOLIC BLOOD PRESSURE: 83 MMHG | SYSTOLIC BLOOD PRESSURE: 125 MMHG

## 2024-07-24 VITALS
BODY MASS INDEX: 29.73 KG/M2 | TEMPERATURE: 98 F | HEART RATE: 73 BPM | WEIGHT: 200.75 LBS | DIASTOLIC BLOOD PRESSURE: 88 MMHG | RESPIRATION RATE: 16 BRPM | SYSTOLIC BLOOD PRESSURE: 128 MMHG | HEIGHT: 69 IN

## 2024-07-24 DIAGNOSIS — B20 HIV DISEASE: ICD-10-CM

## 2024-07-24 DIAGNOSIS — Z11.3 ROUTINE SCREENING FOR STI (SEXUALLY TRANSMITTED INFECTION): ICD-10-CM

## 2024-07-24 DIAGNOSIS — G47.00 INSOMNIA, UNSPECIFIED TYPE: ICD-10-CM

## 2024-07-24 DIAGNOSIS — Z23 NEED FOR VACCINATION: ICD-10-CM

## 2024-07-24 DIAGNOSIS — Z12.9 CANCER SCREENING: ICD-10-CM

## 2024-07-24 DIAGNOSIS — B20 HIV DISEASE: Primary | ICD-10-CM

## 2024-07-24 DIAGNOSIS — Z00.00 ENCOUNTER FOR WELLNESS EXAMINATION: Primary | ICD-10-CM

## 2024-07-24 LAB
25(OH)D3+25(OH)D2 SERPL-MCNC: 47 NG/ML (ref 30–80)
ALBUMIN SERPL-MCNC: 4.1 G/DL (ref 3.5–5)
ALBUMIN/GLOB SERPL: 1.3 RATIO (ref 1.1–2)
ALP SERPL-CCNC: 35 UNIT/L (ref 40–150)
ALT SERPL-CCNC: 11 UNIT/L (ref 0–55)
ANION GAP SERPL CALC-SCNC: 6 MEQ/L
AST SERPL-CCNC: 12 UNIT/L (ref 5–34)
BACTERIA #/AREA URNS AUTO: NORMAL /HPF
BASOPHILS # BLD AUTO: 0.03 X10(3)/MCL
BASOPHILS NFR BLD AUTO: 0.5 %
BILIRUB SERPL-MCNC: 0.5 MG/DL
BILIRUB UR QL STRIP.AUTO: NEGATIVE
BUN SERPL-MCNC: 14.2 MG/DL (ref 8.9–20.6)
C TRACH DNA SPEC QL NAA+PROBE: NOT DETECTED
CALCIUM SERPL-MCNC: 9.8 MG/DL (ref 8.4–10.2)
CHLORIDE SERPL-SCNC: 104 MMOL/L (ref 98–107)
CHOLEST SERPL-MCNC: 167 MG/DL
CHOLEST/HDLC SERPL: 2 {RATIO} (ref 0–5)
CLARITY UR: CLEAR
CO2 SERPL-SCNC: 29 MMOL/L (ref 22–29)
COLOR UR AUTO: NORMAL
CREAT SERPL-MCNC: 1.02 MG/DL (ref 0.73–1.18)
CREAT/UREA NIT SERPL: 14
EOSINOPHIL # BLD AUTO: 0.02 X10(3)/MCL (ref 0–0.9)
EOSINOPHIL NFR BLD AUTO: 0.3 %
ERYTHROCYTE [DISTWIDTH] IN BLOOD BY AUTOMATED COUNT: 13.2 % (ref 11.5–17)
EST. AVERAGE GLUCOSE BLD GHB EST-MCNC: 108.3 MG/DL
GFR SERPLBLD CREATININE-BSD FMLA CKD-EPI: >60 ML/MIN/1.73/M2
GLOBULIN SER-MCNC: 3.2 GM/DL (ref 2.4–3.5)
GLUCOSE SERPL-MCNC: 96 MG/DL (ref 74–100)
GLUCOSE UR QL STRIP: NORMAL
HBA1C MFR BLD: 5.4 %
HCT VFR BLD AUTO: 41.1 % (ref 42–52)
HDLC SERPL-MCNC: 72 MG/DL (ref 35–60)
HGB BLD-MCNC: 13.9 G/DL (ref 14–18)
HGB UR QL STRIP: NEGATIVE
HYALINE CASTS #/AREA URNS LPF: NORMAL /LPF
IMM GRANULOCYTES # BLD AUTO: 0.01 X10(3)/MCL (ref 0–0.04)
IMM GRANULOCYTES NFR BLD AUTO: 0.2 %
KETONES UR QL STRIP: NEGATIVE
LDLC SERPL CALC-MCNC: 81 MG/DL (ref 50–140)
LEUKOCYTE ESTERASE UR QL STRIP: NEGATIVE
LYMPHOCYTES # BLD AUTO: 2.4 X10(3)/MCL (ref 0.6–4.6)
LYMPHOCYTES NFR BLD AUTO: 36.8 %
MCH RBC QN AUTO: 31.5 PG (ref 27–31)
MCHC RBC AUTO-ENTMCNC: 33.8 G/DL (ref 33–36)
MCV RBC AUTO: 93.2 FL (ref 80–94)
MONOCYTES # BLD AUTO: 0.31 X10(3)/MCL (ref 0.1–1.3)
MONOCYTES NFR BLD AUTO: 4.7 %
N GONORRHOEA DNA SPEC QL NAA+PROBE: NOT DETECTED
NEUTROPHILS # BLD AUTO: 3.76 X10(3)/MCL (ref 2.1–9.2)
NEUTROPHILS NFR BLD AUTO: 57.5 %
NITRITE UR QL STRIP: NEGATIVE
NRBC BLD AUTO-RTO: 0 %
PH UR STRIP: 7.5 [PH]
PLATELET # BLD AUTO: 267 X10(3)/MCL (ref 130–400)
PMV BLD AUTO: 10.1 FL (ref 7.4–10.4)
POTASSIUM SERPL-SCNC: 3.8 MMOL/L (ref 3.5–5.1)
PROT SERPL-MCNC: 7.3 GM/DL (ref 6.4–8.3)
PROT UR QL STRIP: NEGATIVE
RBC # BLD AUTO: 4.41 X10(6)/MCL (ref 4.7–6.1)
RBC #/AREA URNS AUTO: NORMAL /HPF
SODIUM SERPL-SCNC: 139 MMOL/L (ref 136–145)
SOURCE (OHS): NORMAL
SP GR UR STRIP.AUTO: 1.02 (ref 1–1.03)
SQUAMOUS #/AREA URNS LPF: NORMAL /HPF
T4 FREE SERPL-MCNC: 0.89 NG/DL (ref 0.7–1.48)
TRIGL SERPL-MCNC: 71 MG/DL (ref 34–140)
TSH SERPL-ACNC: 0.8 UIU/ML (ref 0.35–4.94)
UROBILINOGEN UR STRIP-ACNC: NORMAL
VLDLC SERPL CALC-MCNC: 14 MG/DL
WBC # BLD AUTO: 6.53 X10(3)/MCL (ref 4.5–11.5)
WBC #/AREA URNS AUTO: NORMAL /HPF

## 2024-07-24 PROCEDURE — 86360 T CELL ABSOLUTE COUNT/RATIO: CPT

## 2024-07-24 PROCEDURE — 3079F DIAST BP 80-89 MM HG: CPT | Mod: CPTII,,,

## 2024-07-24 PROCEDURE — 99214 OFFICE O/P EST MOD 30 MIN: CPT | Mod: PBBFAC,27,PN

## 2024-07-24 PROCEDURE — 84443 ASSAY THYROID STIM HORMONE: CPT

## 2024-07-24 PROCEDURE — 87491 CHLMYD TRACH DNA AMP PROBE: CPT

## 2024-07-24 PROCEDURE — 80061 LIPID PANEL: CPT

## 2024-07-24 PROCEDURE — 87591 N.GONORRHOEAE DNA AMP PROB: CPT

## 2024-07-24 PROCEDURE — 82306 VITAMIN D 25 HYDROXY: CPT

## 2024-07-24 PROCEDURE — 81015 MICROSCOPIC EXAM OF URINE: CPT

## 2024-07-24 PROCEDURE — 1160F RVW MEDS BY RX/DR IN RCRD: CPT | Mod: CPTII,,,

## 2024-07-24 PROCEDURE — 80053 COMPREHEN METABOLIC PANEL: CPT

## 2024-07-24 PROCEDURE — 36415 COLL VENOUS BLD VENIPUNCTURE: CPT

## 2024-07-24 PROCEDURE — 99385 PREV VISIT NEW AGE 18-39: CPT | Mod: S$PBB,,,

## 2024-07-24 PROCEDURE — 84439 ASSAY OF FREE THYROXINE: CPT

## 2024-07-24 PROCEDURE — 3008F BODY MASS INDEX DOCD: CPT | Mod: CPTII,,,

## 2024-07-24 PROCEDURE — 85025 COMPLETE CBC W/AUTO DIFF WBC: CPT

## 2024-07-24 PROCEDURE — 1159F MED LIST DOCD IN RCRD: CPT | Mod: CPTII,,,

## 2024-07-24 PROCEDURE — 99213 OFFICE O/P EST LOW 20 MIN: CPT | Mod: PBBFAC

## 2024-07-24 PROCEDURE — 87536 HIV-1 QUANT&REVRSE TRNSCRPJ: CPT

## 2024-07-24 PROCEDURE — 3074F SYST BP LT 130 MM HG: CPT | Mod: CPTII,,,

## 2024-07-24 PROCEDURE — 83036 HEMOGLOBIN GLYCOSYLATED A1C: CPT

## 2024-07-24 PROCEDURE — 86359 T CELLS TOTAL COUNT: CPT

## 2024-07-24 RX ORDER — HYDROXYZINE PAMOATE 25 MG/1
25 CAPSULE ORAL 3 TIMES DAILY
Qty: 90 CAPSULE | Refills: 2 | Status: SHIPPED | OUTPATIENT
Start: 2024-07-24 | End: 2024-10-22

## 2024-07-24 RX ORDER — BICTEGRAVIR SODIUM, EMTRICITABINE, AND TENOFOVIR ALAFENAMIDE FUMARATE 50; 200; 25 MG/1; MG/1; MG/1
1 TABLET ORAL DAILY
Qty: 90 TABLET | Refills: 1 | Status: SHIPPED | OUTPATIENT
Start: 2024-07-24

## 2024-07-24 NOTE — PROGRESS NOTES
[facility] Infectious Diseases Clinic Note    Subjective:      MICAH Sweet is a 37 y/o M with PMH HIV disease (last Cd4 636 (37 %), VL undetected 04/2024 ) who presents to ID clinic today for follow up visit. The patient is currently taking Biktarvy and endorses 100% compliance.  Patient with no acute events since his last visit.  He denies having any fevers, chills, shortness breath, chest pain, abdominal pain, dysuria, constipation, or diarrhea.  Patient does note having some mild weight gain while on medication.  Patient is interested in switching to Cabenuva injections at this time.  Patient educated on injection schedule and need to get treatments as scheduled.  For the time being, patient told to continue Biktarvy treatments and to have medication on standby in case any emergency arises.    HIV history:  Diagnosed 2016 - CD4 and VL at diagnosis unknown  Risk factor - MSM  History OI - None  History STIs - None  Prior ART:                 - Genvoya (2016)                 - started on Biktarvy (2018)                 -2019: viral load 1730, CD4 834  Genotype testing 2019:                 (+) K103R  HLA- testing unknown  G6PD testing unknown      Labs  HIV Viral Load: check q3-6 months  HIV VL: Undetectable, Date: 6/2023      CD4 Count: check q3-6 months  Lab Results   Component Value Date    WBCABSOLUTE 5,110 06/12/2023    LYMPHPERCENT 47.9 06/12/2023    LYMPHABSOLUT 2,447.69 06/12/2023    [id number] [id number] 06/12/2023    TCELLINTERP  06/12/2023     Normal absolute lymphocyte count with normal absolute CD4+ lymphocyte count.    Roger Briones M.D.        CBC with Differential: check q3-6 months  Lab Results   Component Value Date    WBC 7.23 04/23/2024    RBC 4.60 (L) 04/23/2024    HGB 14.0 04/23/2024    HCT 42.0 04/23/2024    MCV 91.3 04/23/2024    MCH 30.4 04/23/2024    MCHC 33.3 04/23/2024    RDW 13.2 04/23/2024     04/23/2024    MPV 9.9 04/23/2024     CMP: check q3-6 months  Lab Results  "  Component Value Date     04/23/2024    K 3.7 04/23/2024     04/23/2024    GLUCOSE 91 04/23/2024    BUN 18.7 04/23/2024    CREATININE 0.89 04/23/2024    CALCIUM 9.9 04/23/2024    BILITOT 0.4 04/23/2024    AST 15 04/23/2024    ALT 14 04/23/2024    ALKPHOS 35 (L) 04/23/2024    ALBUMIN 4.2 04/23/2024     Quantiferon: check q1yr  TB Gold: Not Detected, Date: 6/2023    Syphilis IgG: check q1yr, unless Hx syphilis then RPR titer q1yr  Lab Results   Component Value Date    SYPHAB Nonreactive 04/23/2024    LABRPR Non-Reactive 04/26/2017     Toxoplasmosis IgG: check once  No results found for: "TXIGMS", "TOXIGGS", "TOXIGGVL"  Gonorrhea: check q1yr  Lab Results   Component Value Date    NGONNO Not Detected 04/23/2024    NGONNO Not Detected 04/23/2024    LABCHLAPCR Not Detected 04/23/2024    LABCHLAPCR Not Detected 04/23/2024     Lipid Panel: check q1yr  Lab Results   Component Value Date    TRIG 85 03/04/2021    CHOL 170 03/04/2021    HDL 59 03/04/2021    VLDL 17 03/04/2021     A1C: check q1yr  Lab Results   Component Value Date    HGBA1C 5.4 04/26/2017     Urinalysis: check q1yr, check q6mo if taking tenofovir  Lab Results   Component Value Date    PROTEINUA Negative 04/23/2024    UROBILINOGEN Normal 04/23/2024    KETONESU Negative 01/20/2022    NITRITE Negative 04/23/2024    LEUKOCYTESUR Negative 04/23/2024    COLORU Colorless (A) 04/23/2024    RBCUA 0-5 04/23/2024     Glucose-6 Phosphate Dehydrogenase: check once  No results found for: "B5QHACD"  Hepatitis A IgG: check once  Lab Results   Component Value Date    HEPAIGG Reactive (A) 11/15/2022     Hepatitis B Surface Antibody: check once  Lab Results   Component Value Date    HEPBSAB Reactive (A) 04/23/2024    HBSABQUANT 24,802.54 04/23/2024     Hepatitis C Antibody: check q1yr if high risk, once if low risk  Lab Results   Component Value Date    HEPCAB Nonreactive 04/23/2024         Vaccines  Pneumovax-23: if CD4>200 give twice q5yrs apart before age 65, then " "once at 65, give >8wks from last Prevnar  Prevnar-13: if CD4>200 give once, give >1yr from last Pneumovax-23  Influenza vaccine: q1yr  HPV: Gardasil-9 at 0, 2, and 6 months for ages 15-45  Tdap: if age >35 give Td q10yrs, replace with Tdap once  MenACWY: 2 dose primary series 8 weeks apart, then 1 dose booster q5yr  Shingrix: All HIV above >19 y/o receive 2 dose series, timed 4 weeks to 6 months apart  Immunization History   Administered Date(s) Administered    COVID-19, MRNA, LN-S, PF (Pfizer) (Purple Cap) 03/10/2021, 03/31/2021    DTP 1987, 02/01/1988, 09/01/1988, 02/13/1989, 09/01/1992    HIB 07/26/1990    HPV 9-Valent 06/12/2023, 04/23/2024    Hepatitis A / Hepatitis B 03/27/2019, 07/17/2019    Hepatitis B 03/12/1998    Hepatitis B, Pediatric/Adolescent 11/06/1997, 12/11/1997, 09/13/2004, 05/17/2005    Influenza - Quadrivalent 09/28/2016, 01/20/2022    Influenza - Quadrivalent - PF *Preferred* (6 months and older) 11/19/2019    Influenza - Trivalent - PF (ADULT) 10/09/2015, 11/19/2019    MMR 02/13/1989, 09/01/1992    Meningococcal Conjugate (MCV4O) 2 Vial (2mo-55yr) 03/27/2019, 07/17/2019    Meningococcal Conjugate (MCV4P) 01/23/2008    OPV 1987, 02/01/1988, 02/13/1989, 09/01/1992    Pneumococcal Conjugate - 13 Valent 02/20/2015    Pneumococcal Polysaccharide - 23 Valent 07/16/2015, 01/20/2022    Td - PF (ADULT) 12/11/1997, 09/13/2004    Tdap 06/11/2020    Varicella 09/13/2004, 05/17/2005       Imaging  DEXA Scan: if age>50, check q10yrs  No results found for this or any previous visit.    Pathology:  Pap Smear: if female and age >21 check cervical pap q1yr, if male and MSM check rectal pap q1yr  No results found for: "PAP"    ASCVD Risk Score:  Consider high-intensity statin therapy if 10-year ASCVD risk score >7.5%  The ASCVD Risk score (oKri VALLE, et al., 2019) failed to calculate for the following reasons:    The 2019 ASCVD risk score is only valid for ages 40 to 79    Review of Systems " "  Constitutional: Negative.    HENT: Negative.     Eyes: Negative.    Respiratory: Negative.     Cardiovascular: Negative.    Gastrointestinal: Negative.    Genitourinary: Negative.    Musculoskeletal: Negative.    Skin: Negative.    Neurological: Negative.    Endo/Heme/Allergies: Negative.        The following portions of the patient's history were reviewed and updated as appropriate: allergies, current medications, past family history, past medical history, past social history, past surgical history and problem list.    Objective:   Vitals:  /88 (BP Location: Right arm, Patient Position: Sitting, BP Method: Medium (Automatic))   Pulse 73   Temp 98.1 °F (36.7 °C) (Oral)   Resp 16   Ht 5' 9" (1.753 m)   Wt 91 kg (200 lb 11.7 oz)   BMI 29.64 kg/m²  Body mass index is 29.64 kg/m².    Gen: awake, alert, NAD  HEENT: NC/AT, EOMi, anicteric sclera, no rhinorrhea, OP clear  Neck: no cervical LAD  CV: regular rate normal rhythm no murmur  Resp: easy WOB on RA CTABL no w/r/r  Abd: +BS, soft, NTTP, no HSM  Ext: warm, no LE edema  Skin: no rash  Neuro: no focal deficits       Assessment      1) HIV disease, controlled on ART. Most recent CD4 636 (37 %) and VL undetected on 04/2024  2) Need for STI screen today  3) Need for HPV vaccination  4) Healthcare maintenance   5) Vit D def    Plan    - Order repeat labs today (CBC, CMP, Cd4, VL)  - Pt interested in Cabenuva, hepatitis panel negative. Continue Biktarvy for now. Refills given.   - UA, GC/chlamydia urine NAAT  - Anal PAP:  We will check next visit  - Checking yearly quantiferon gold. Next due:  04/2025  - Educated on importance of 100% medication compliance  - Reviewed vaccinations today. Due for HPV vaccination. Will restart Gardasil-9 HPV vaccine series today, part 2 of 3 4/23/24  - Will have pharmacy call patient discuss switching over to Cabenuva injection    RTC 3 months    Lyndon Sal MD  [facility] Internal Medicine, HO[facility]          "

## 2024-07-24 NOTE — ASSESSMENT & PLAN NOTE
Avoid caffeine, alcohol and stimulants. Do not use illicit drugs.  Practice positive phrases and repeat throughout the day.  Try yoga, lavender scents or Chamomile tea to promote relaxation.  Set healthy boundaries, avoid people and conversations that increase stress.  Avoid caffeinated beverages after lunch  Avoid alcohol near bedtime (eg, late afternoon and evening)  Avoid smoking or other nicotine intake, particularly during the evening  Exercise regularly for at least 20 minutes, preferably more than four to five hours prior to bedtime  Avoid daytime naps, especially if they are longer than 20 to 30 minutes or occur late in the day  Resolve concerns or worries before bedtime  Try not to force sleep    Sleep Hygiene Techniques: Sleep hygiene refers to actions that tend to improve and maintain good sleep  Power down electronic devices at least one hour prior to bedtime.  Keep room dark; use eye mask or relaxation sound machine to promote rest.  Sleep as long as necessary to feel rested (usually seven to eight hours for adults) and then get out of bed  Maintain a regular sleep schedule, particularly a regular wake-up time in the morning      Trial vistaril 25 mg as needed for insomnia, discussed is safe to use during day for anxiety as well if needed.

## 2024-07-24 NOTE — PROGRESS NOTES
Patient Name: Angel Sweet     : 1985    MRN: [id number]     Subjective:     Patient ID: Angel Sweet is a 38 y.o. male.    Chief Complaint:   Chief Complaint   Patient presents with    Establish Care     New patient. Establish care. No current c/o.         HPI: 2024: Patient presents to clinic today to establish care, patient has no chronic medical conditions which they have ever been managed other than HIV, he follows Dr. Hankins and team in ID clinic.  Patient does not take any daily medications that they need refilled, states uses mail pharmacy. Patient denies chest pain, palpitations, and shortness of breath.  Patient denies fever, night sweats, chills, nausea, vomiting, diarrhea, constipation, weight loss, and changes in appetite.  Wellness labs (CBC, CMP, A1c, FLP, TSH, Free T4) ordered for patient today.  Does complain of occasional insomnia.  States that sometimes he sleeps perfectly fine but every now and then has difficult time falling asleep and waking up rested, denies any witnessed apneas snoring.  Denies this issue occurring daily.         ROS:      12 point review of systems conducted, negative except as stated in the history of present illness. See HPI for details.    History:     Past Medical History:   Diagnosis Date    Allergy     Depression     HIV infection         Past Surgical History:   Procedure Laterality Date    LIPOSUCTION  2022       Family History   Problem Relation Name Age of Onset    Hypertension Mother Sherryle     Diabetes Mother Sherryle     Hypertension Father Winston     Diabetes Father Winston     No Known Problems Sister April     No Known Problems Sister      No Known Problems Brother Garcia         Social History     Tobacco Use    Smoking status: Never    Smokeless tobacco: Never   Substance and Sexual Activity    Alcohol use: Yes     Alcohol/week: 12.0 standard drinks of alcohol     Types: 6 Glasses of wine, 6 Shots of liquor per week    Drug use:  "Never    Sexual activity: Yes     Partners: Male       Current Outpatient Medications   Medication Instructions    BIKTARVY -25 mg (25 kg or greater) 1 tablet, Oral, Daily    cholecalciferol (vitamin D3) (VITAMIN D3) 4,000 Units, Oral, Daily, For low vitamin D level    hydrOXYzine pamoate (VISTARIL) 25 mg, Oral, 3 times daily        Review of patient's allergies indicates:  No Known Allergies    Objective:     Visit Vitals  /83 (BP Location: Left arm, Patient Position: Sitting)   Pulse 69   Temp 98.5 °F (36.9 °C) (Oral)   Resp 20   Ht 5' 9" (1.753 m)   Wt 90.7 kg (199 lb 14.4 oz)   SpO2 100%   BMI 29.52 kg/m²       Physical Examination:     Physical Exam  Constitutional:       General: He is not in acute distress.     Appearance: Normal appearance. He is not ill-appearing.   Cardiovascular:      Rate and Rhythm: Normal rate and regular rhythm.      Heart sounds: Normal heart sounds.   Pulmonary:      Effort: Pulmonary effort is normal. No respiratory distress.      Breath sounds: Normal breath sounds.   Musculoskeletal:      Cervical back: Normal range of motion.   Skin:     General: Skin is warm and dry.   Neurological:      Mental Status: He is alert and oriented to person, place, and time.   Psychiatric:         Mood and Affect: Mood normal.         Behavior: Behavior normal.           Assessment:          ICD-10-CM ICD-9-CM   1. Encounter for wellness examination  Z00.00 V70.0   2. HIV disease  B20 042   3. Insomnia, unspecified type  G47.00 780.52        Plan:     1. Encounter for wellness examination  -     TSH; Future; Expected date: 07/24/2024  -     T4, Free; Future; Expected date: 07/24/2024  -     Hemoglobin A1C; Future; Expected date: 07/24/2024  -     Lipid Panel; Future; Expected date: 07/24/2024  -     CBC Auto Differential; Future; Expected date: 07/24/2024  -     Comprehensive Metabolic Panel; Future; Expected date: 07/24/2024  -     Vitamin D; Future; Expected date: 07/24/2024  -     " Urinalysis, Reflex to Urine Culture; Future; Expected date: 07/24/2024    2. HIV disease  -     HIV RNA, Quantitative, PCR  -     CD4 Lymphocytes  -     Cancel: Comprehensive Metabolic Panel  -     Cancel: CBC Auto Differential    3. Insomnia, unspecified type  Assessment & Plan:  Avoid caffeine, alcohol and stimulants. Do not use illicit drugs.  Practice positive phrases and repeat throughout the day.  Try yoga, lavender scents or Chamomile tea to promote relaxation.  Set healthy boundaries, avoid people and conversations that increase stress.  Avoid caffeinated beverages after lunch  Avoid alcohol near bedtime (eg, late afternoon and evening)  Avoid smoking or other nicotine intake, particularly during the evening  Exercise regularly for at least 20 minutes, preferably more than four to five hours prior to bedtime  Avoid daytime naps, especially if they are longer than 20 to 30 minutes or occur late in the day  Resolve concerns or worries before bedtime  Try not to force sleep    Sleep Hygiene Techniques: Sleep hygiene refers to actions that tend to improve and maintain good sleep  Power down electronic devices at least one hour prior to bedtime.  Keep room dark; use eye mask or relaxation sound machine to promote rest.  Sleep as long as necessary to feel rested (usually seven to eight hours for adults) and then get out of bed  Maintain a regular sleep schedule, particularly a regular wake-up time in the morning      Trial vistaril 25 mg as needed for insomnia, discussed is safe to use during day for anxiety as well if needed.     Orders:  -     hydrOXYzine pamoate (VISTARIL) 25 MG Cap; Take 1 capsule (25 mg total) by mouth 3 (three) times daily.  Dispense: 90 capsule; Refill: 2         Follow up in about 1 year (around 7/24/2025), or if symptoms worsen or fail to improve, for annual.    Future Appointments   Date Time Provider Department Center   10/29/2024 10:40 AM RESIDENT, [facility] INFECTIOUS DISEASE [facility]  Jose M Un   7/24/2025  8:30 AM Mirella Zaidi NP [facility]        Mirella Zaidi NP      This note was created with the assistance of a voice recognition software or phone dictation. There may be transcription errors as a result of using this technology however minimal. Effort has been made to assure accuracy of transcription but any obvious errors or omissions should be clarified with the author of the document

## 2024-07-25 ENCOUNTER — PATIENT MESSAGE (OUTPATIENT)
Dept: FAMILY MEDICINE | Facility: CLINIC | Age: 39
End: 2024-07-25
Payer: MEDICAID

## 2024-07-25 LAB
C TRACH RRNA SPEC QL NAA+PROBE: NEGATIVE
N GONORRHOEA RRNA SPEC QL NAA+PROBE: NEGATIVE
SPECIMEN SOURCE: NORMAL
SPECIMEN SOURCE: NORMAL

## 2024-07-26 LAB
CD3 CELLS # BLD: 1753 CELLS/MCL (ref 550–2202)
CD3 CELLS NFR BLD: 95 % (ref 58–86)
CD3+CD4+ CELLS # BLD: 743 CELLS/MCL (ref 365–1437)
CD3+CD4+ CELLS NFR BLD: 40 % (ref 32–64)
CD3+CD4+ CELLS/CD3+CD8+ CLL BLD: 0.7 %
CD3+CD8+ CELLS # BLD: 1003 CELLS/MCL (ref 171–846)
CD3+CD8+ CELLS NFR BLD: 54 % (ref 15–40)
CD45 CELLS # BLD: 1.85 THOU/MCL (ref 0.82–2.84)

## 2024-07-27 LAB — HIV1 RNA SERPL NAA+PROBE-LOG#: ABNORMAL {LOG_COPIES}/ML

## 2024-08-06 ENCOUNTER — CLINICAL SUPPORT (OUTPATIENT)
Dept: INFECTIOUS DISEASES | Facility: CLINIC | Age: 39
End: 2024-08-06
Payer: MEDICAID

## 2024-08-06 DIAGNOSIS — B20 HIV DISEASE: Primary | ICD-10-CM

## 2024-08-06 PROCEDURE — 99211 OFF/OP EST MAY X REQ PHY/QHP: CPT | Mod: PBBFAC

## 2024-08-06 PROCEDURE — 96372 THER/PROPH/DIAG INJ SC/IM: CPT | Mod: PBBFAC

## 2024-08-06 RX ADMIN — CABOTEGRAVIR AND RILPIVIRINE 6 ML: KIT at 08:08

## 2024-08-15 DIAGNOSIS — E55.9 VITAMIN D DEFICIENCY: ICD-10-CM

## 2024-08-15 RX ORDER — ACETAMINOPHEN 500 MG
4000 TABLET ORAL DAILY
Qty: 60 CAPSULE | Refills: 2 | Status: SHIPPED | OUTPATIENT
Start: 2024-08-15 | End: 2024-11-13

## 2024-08-29 ENCOUNTER — TELEPHONE (OUTPATIENT)
Dept: INFECTIOUS DISEASES | Facility: CLINIC | Age: 39
End: 2024-08-29
Payer: MEDICAID

## 2024-09-03 ENCOUNTER — LAB VISIT (OUTPATIENT)
Dept: LAB | Facility: HOSPITAL | Age: 39
End: 2024-09-03
Attending: STUDENT IN AN ORGANIZED HEALTH CARE EDUCATION/TRAINING PROGRAM
Payer: MEDICAID

## 2024-09-03 ENCOUNTER — CLINICAL SUPPORT (OUTPATIENT)
Dept: INFECTIOUS DISEASES | Facility: CLINIC | Age: 39
End: 2024-09-03
Payer: MEDICAID

## 2024-09-03 DIAGNOSIS — B20 HIV DISEASE: ICD-10-CM

## 2024-09-03 DIAGNOSIS — B20 HIV DISEASE: Primary | ICD-10-CM

## 2024-09-03 LAB — HIV 1+2 AB+HIV1 P24 AG SERPL QL IA: REACTIVE

## 2024-09-03 PROCEDURE — 87536 HIV-1 QUANT&REVRSE TRNSCRPJ: CPT | Mod: 59

## 2024-09-03 PROCEDURE — 87389 HIV-1 AG W/HIV-1&-2 AB AG IA: CPT

## 2024-09-03 PROCEDURE — 96372 THER/PROPH/DIAG INJ SC/IM: CPT | Mod: PBBFAC

## 2024-09-03 PROCEDURE — 86702 HIV-2 ANTIBODY: CPT

## 2024-09-03 PROCEDURE — 86701 HIV-1ANTIBODY: CPT

## 2024-09-03 PROCEDURE — 99211 OFF/OP EST MAY X REQ PHY/QHP: CPT | Mod: PBBFAC,25

## 2024-09-03 PROCEDURE — 87536 HIV-1 QUANT&REVRSE TRNSCRPJ: CPT

## 2024-09-03 PROCEDURE — 36415 COLL VENOUS BLD VENIPUNCTURE: CPT

## 2024-09-03 RX ADMIN — CABOTEGRAVIR AND RILPIVIRINE 6 ML: KIT at 10:09

## 2024-09-03 NOTE — PROGRESS NOTES
Cabenuva criteria:       Does the patient meet all of the below:  Weighs at least 77 pounds (35 kg)? Yes     Pt is NOT currently pregnant or planning to conceive? NA     Virally suppressed (HIV-1 RNA <50 copies/mL) with 2 VL <50 c/mL in the past 12 months w/ 1 value in the last 6 months)? Yes     Patient agreeable to receive 2 IM gluteal injections once monthly for 2 doses and then every 2 months thereafter? Yes     This patient does NOT have any dermatological condition or previous procedures (including implants or fillers) that interfere with the ability to receive ventro- or dorsogluteal injections, and is willing to not get deep tissue massages in the injection site area? Yes     This patient does NOT take any contraindicated medications (carbamazepine, oxcarbazepine, phenobarbital, phenytoin, rifabutin, rifampin, rifapentine, continued systemic dexamethasone, st. Johns wort, omeprazole, esomeprazole): Yes     The patient does NOT have an active Hepatitis B infection: Yes     The patient does NOT have any mutations/resistance or previous virologic failures? Yes     The patient does NOT have any known allergies or tolerance issues with rilpivirine nor cabotegravir? Yes     Reasons for patient/provider wanting Cabenuva:  Pill burden         Reviewed Cabenuva medication criteria to deem patient eligible for injection. Discussed with patient at each visit to make certain maintained eligibility.      Performed medication interaction check prior to administration consideration from active medication list.      Performed HIV resistance testing history review, then entered resistance history of non-sense mutations via [facility] HIV drug resistance database if information available prior to initiation of overall therapy.     Instructed patient to discontinue current ART oral therapy day prior to Cabenuva administration.     Counseled patient on importance of maintaining visits to clinic for injectable  medication.   If patient does not maintain visits, will discuss with provider to restart an oral regimen.      Counseled patient on adverse reactions of injectable to include injection site reaction/rash     Follow up to clinic 1 month post initial injection then once every 2 months after completed initial series  .   Confirmed prior authorization from payor source prior to injection.     Patients last CD4 and date: 720; 7-24-24     Patients last Viral load and date: undetectable 4-23-24     Prior to initial injection Baseline STD testing completed     Female at age of reproductions: negative pregnancy test NA     Will offer STI screening each visit. None needed at this time     A viral load will be assessed (unless otherwise specified by the provider) at the time of the 1st injection (i.e. if oral lead-in) and/or between 1 and 3 months after 1st injection.         No STI screening needed today. Will draw VL at this visit. Month 4 visit with Dr. Hanikns and myself.      Rene Linares, Pharm D

## 2024-09-04 LAB — HIV1 RNA SERPL NAA+PROBE-LOG#: NOT DETECTED {LOG_COPIES}/ML

## 2024-09-05 LAB
HIV 2 AB SERPLBLD QL IA.RAPID: NEGATIVE
HIV1 AB SERPLBLD QL IA.RAPID: POSITIVE

## 2024-09-06 LAB — HIV1 RNA # PLAS NAA DL=20: 127 COPIES/ML

## 2024-09-09 ENCOUNTER — TELEPHONE (OUTPATIENT)
Dept: INFECTIOUS DISEASES | Facility: CLINIC | Age: 39
End: 2024-09-09
Payer: MEDICAID

## 2024-09-09 DIAGNOSIS — B20 HIV DISEASE: Primary | ICD-10-CM

## 2024-09-09 NOTE — TELEPHONE ENCOUNTER
Called and spoke with patient about a viral load result. Stated we need patient to come in soon to confirm or deny the level. Patient voiced understanding and stated can be tomorrow morning (9-10-24). Discussed with patient, if viral load returned elevated, would need to back to oral medications. Understood.   Rene Linares, Pharm D

## 2024-09-09 NOTE — PROGRESS NOTES
Patient's HIV-RNA was collected twice at his last visit with the first sample being negative and the 2nd showing low level viremia of 127. Unclear of the significance of this but will need to repeat viral load to assess trend. Will place order for patient to have this test repeated this week.    Cole Hankins MD  Infectious Diseases [facility]   of Medicine

## 2024-09-10 ENCOUNTER — LAB VISIT (OUTPATIENT)
Dept: LAB | Facility: HOSPITAL | Age: 39
End: 2024-09-10
Attending: STUDENT IN AN ORGANIZED HEALTH CARE EDUCATION/TRAINING PROGRAM
Payer: MEDICAID

## 2024-09-10 DIAGNOSIS — B20 HIV DISEASE: ICD-10-CM

## 2024-09-10 PROCEDURE — 87536 HIV-1 QUANT&REVRSE TRNSCRPJ: CPT

## 2024-09-10 PROCEDURE — 36415 COLL VENOUS BLD VENIPUNCTURE: CPT

## 2024-09-11 LAB — HIV1 RNA SERPL NAA+PROBE-LOG#: ABNORMAL {LOG_COPIES}/ML

## 2024-10-08 DIAGNOSIS — G47.00 INSOMNIA, UNSPECIFIED TYPE: ICD-10-CM

## 2024-10-09 RX ORDER — HYDROXYZINE PAMOATE 25 MG/1
25 CAPSULE ORAL 3 TIMES DAILY
Qty: 90 CAPSULE | Refills: 2 | Status: SHIPPED | OUTPATIENT
Start: 2024-10-09 | End: 2025-01-07

## 2024-10-18 DIAGNOSIS — E55.9 VITAMIN D DEFICIENCY: ICD-10-CM

## 2024-10-18 RX ORDER — ACETAMINOPHEN 500 MG
4000 TABLET ORAL DAILY
Qty: 30 CAPSULE | Refills: 0 | Status: SHIPPED | OUTPATIENT
Start: 2024-10-18

## 2024-11-05 ENCOUNTER — CLINICAL SUPPORT (OUTPATIENT)
Dept: INFECTIOUS DISEASES | Facility: CLINIC | Age: 39
End: 2024-11-05
Payer: MEDICAID

## 2024-11-05 DIAGNOSIS — B20 HIV DISEASE: Primary | ICD-10-CM

## 2024-11-05 LAB
BASOPHILS # BLD AUTO: 0.03 X10(3)/MCL
BASOPHILS NFR BLD AUTO: 0.6 %
EOSINOPHIL # BLD AUTO: 0.02 X10(3)/MCL (ref 0–0.9)
EOSINOPHIL NFR BLD AUTO: 0.4 %
ERYTHROCYTE [DISTWIDTH] IN BLOOD BY AUTOMATED COUNT: 13 % (ref 11.5–17)
HCT VFR BLD AUTO: 41.5 % (ref 42–52)
HGB BLD-MCNC: 14 G/DL (ref 14–18)
IMM GRANULOCYTES # BLD AUTO: 0 X10(3)/MCL (ref 0–0.04)
IMM GRANULOCYTES NFR BLD AUTO: 0 %
LYMPHOCYTES # BLD AUTO: 2.15 X10(3)/MCL (ref 0.6–4.6)
LYMPHOCYTES NFR BLD AUTO: 43 %
MCH RBC QN AUTO: 30.6 PG (ref 27–31)
MCHC RBC AUTO-ENTMCNC: 33.7 G/DL (ref 33–36)
MCV RBC AUTO: 90.8 FL (ref 80–94)
MONOCYTES # BLD AUTO: 0.28 X10(3)/MCL (ref 0.1–1.3)
MONOCYTES NFR BLD AUTO: 5.6 %
NEUTROPHILS # BLD AUTO: 2.52 X10(3)/MCL (ref 2.1–9.2)
NEUTROPHILS NFR BLD AUTO: 50.4 %
NRBC BLD AUTO-RTO: 0 %
PLATELET # BLD AUTO: 250 X10(3)/MCL (ref 130–400)
PLATELETS.RETICULATED NFR BLD AUTO: 2.5 % (ref 0.9–11.2)
PMV BLD AUTO: 10.6 FL (ref 7.4–10.4)
RBC # BLD AUTO: 4.57 X10(6)/MCL (ref 4.7–6.1)
WBC # BLD AUTO: 5 X10(3)/MCL (ref 4.5–11.5)

## 2024-11-05 PROCEDURE — 87536 HIV-1 QUANT&REVRSE TRNSCRPJ: CPT

## 2024-11-05 PROCEDURE — 36415 COLL VENOUS BLD VENIPUNCTURE: CPT

## 2024-11-05 PROCEDURE — 85025 COMPLETE CBC W/AUTO DIFF WBC: CPT

## 2024-11-05 PROCEDURE — 96372 THER/PROPH/DIAG INJ SC/IM: CPT | Mod: PBBFAC

## 2024-11-05 PROCEDURE — 86360 T CELL ABSOLUTE COUNT/RATIO: CPT

## 2024-11-05 RX ORDER — CABOTEGRAVIR AND RILPIVIRINE 600-900/3
6 KIT INTRAMUSCULAR ONCE
Qty: 6 ML | Refills: 0 | Status: SHIPPED | OUTPATIENT
Start: 2024-11-05 | End: 2024-11-05

## 2024-11-05 RX ADMIN — CABOTEGRAVIR AND RILPIVIRINE 6 ML: KIT at 11:11

## 2024-11-05 NOTE — PROGRESS NOTES
Cabenuva criteria:       Does the patient meet all of the below:  Weighs at least 77 pounds (35 kg)? Yes     Pt is NOT currently pregnant or planning to conceive? NA     Virally suppressed (HIV-1 RNA <50 copies/mL) with 2 VL <50 c/mL in the past 12 months w/ 1 value in the last 6 months)? Yes     Patient agreeable to receive 2 IM gluteal injections once monthly for 2 doses and then every 2 months thereafter? Yes     This patient does NOT have any dermatological condition or previous procedures (including implants or fillers) that interfere with the ability to receive ventro- or dorsogluteal injections, and is willing to not get deep tissue massages in the injection site area? Yes     This patient does NOT take any contraindicated medications (carbamazepine, oxcarbazepine, phenobarbital, phenytoin, rifabutin, rifampin, rifapentine, continued systemic dexamethasone, st. Johns wort, omeprazole, esomeprazole): Yes     The patient does NOT have an active Hepatitis B infection: Yes     The patient does NOT have any mutations/resistance or previous virologic failures? Yes     The patient does NOT have any known allergies or tolerance issues with rilpivirine nor cabotegravir? Yes     Reasons for patient/provider wanting Cabenuva:  Pill burden         Reviewed Cabenuva medication criteria to deem patient eligible for injection. Discussed with patient at each visit to make certain maintained eligibility.      Performed medication interaction check prior to administration consideration from active medication list.      Performed HIV resistance testing history review, then entered resistance history of non-sense mutations via [facility] HIV drug resistance database if information available prior to initiation of overall therapy.     Instructed patient to discontinue current ART oral therapy day prior to Cabenuva administration.     Counseled patient on importance of maintaining visits to clinic for injectable  medication.   If patient does not maintain visits, will discuss with provider to restart an oral regimen.      Counseled patient on adverse reactions of injectable to include injection site reaction/rash     Follow up to clinic 1 month post initial injection then once every 2 months after completed initial series  .   Confirmed prior authorization from payor source prior to injection.     Patients last CD4 and date: 720; 7-24-24     Patients last Viral load and date: 137 on 9-3-24 (repeated testing, VL sent to another lab and their value states undetectable at 137 level)     Prior to initial injection Baseline STD testing completed     Female at age of reproductions: negative pregnancy test NA     Will offer STI screening each visit. None needed at this time     A viral load will be assessed (unless otherwise specified by the provider) at the time of the 1st injection (i.e. if oral lead-in) and/or between 1 and 3 months after 1st injection.         Month 4 injection (3rd in series). Due visit with provider and myself next visit (rescheduled provider visit for just injection visit). Will repeat viral load, cd4, and cbc today.   Transitioned to acquiring med from outpatient pharmacy.       Rene Linares, Pharm D

## 2024-11-07 LAB
CD3 CELLS # BLD: 1563 CELLS/MCL (ref 550–2202)
CD3 CELLS NFR BLD: 94 % (ref 58–86)
CD3+CD4+ CELLS # BLD: 646 CELLS/MCL (ref 365–1437)
CD3+CD4+ CELLS NFR BLD: 39 % (ref 32–64)
CD3+CD4+ CELLS/CD3+CD8+ CLL BLD: 0.7 %
CD3+CD8+ CELLS # BLD: 904 CELLS/MCL (ref 171–846)
CD3+CD8+ CELLS NFR BLD: 54 % (ref 15–40)
CD45 CELLS # BLD: 1.67 THOU/MCL (ref 0.82–2.84)

## 2024-11-08 LAB — HIV1 RNA # PLAS NAA DL=20: NORMAL COPIES/ML

## 2024-12-04 DIAGNOSIS — E55.9 VITAMIN D DEFICIENCY: ICD-10-CM

## 2024-12-04 RX ORDER — ACETAMINOPHEN 500 MG
4000 TABLET ORAL DAILY
Qty: 60 CAPSULE | Refills: 4 | Status: SHIPPED | OUTPATIENT
Start: 2024-12-04

## 2024-12-20 ENCOUNTER — TELEPHONE (OUTPATIENT)
Dept: ENDOCRINOLOGY | Facility: CLINIC | Age: 39
End: 2024-12-20
Payer: MEDICAID

## 2024-12-20 NOTE — TELEPHONE ENCOUNTER
Pt spoke with our MARIANN, he wants to keep the virtual apt with Marta. He will come to clinic on 1/7 to receive his cabenuva. Will need to be schedule with Dr Hankins next visit.

## 2024-12-20 NOTE — TELEPHONE ENCOUNTER
Received message from Rene Linares pharmacist that pt is scheduled for a Virtual Visit with Marta on 12/30 but he needs to get a Cabenuva shot around Jan 6 or 7.    He can keep the virtual with Marta and come in on the 7th for Cabenuva OR  Reschedule to see Dr Hankins on the 7th and will get Cabenuva at visit.

## 2024-12-27 DIAGNOSIS — G47.00 INSOMNIA, UNSPECIFIED TYPE: ICD-10-CM

## 2024-12-27 RX ORDER — HYDROXYZINE PAMOATE 25 MG/1
25 CAPSULE ORAL 3 TIMES DAILY
Qty: 90 CAPSULE | Refills: 2 | Status: SHIPPED | OUTPATIENT
Start: 2024-12-27 | End: 2025-03-27

## 2024-12-30 ENCOUNTER — TELEPHONE (OUTPATIENT)
Dept: INFECTIOUS DISEASES | Facility: CLINIC | Age: 39
End: 2024-12-30

## 2024-12-30 ENCOUNTER — OFFICE VISIT (OUTPATIENT)
Dept: INFECTIOUS DISEASES | Facility: CLINIC | Age: 39
End: 2024-12-30
Payer: MEDICAID

## 2024-12-30 DIAGNOSIS — B20 HIV DISEASE: Primary | ICD-10-CM

## 2024-12-30 DIAGNOSIS — Z78.9 ALCOHOL USE: ICD-10-CM

## 2024-12-30 DIAGNOSIS — Z12.9 CANCER SCREENING: ICD-10-CM

## 2024-12-30 DIAGNOSIS — E55.9 VITAMIN D DEFICIENCY: ICD-10-CM

## 2024-12-30 DIAGNOSIS — Z23 NEED FOR VACCINATION: ICD-10-CM

## 2024-12-30 PROCEDURE — 3044F HG A1C LEVEL LT 7.0%: CPT | Mod: CPTII,95,, | Performed by: NURSE PRACTITIONER

## 2024-12-30 PROCEDURE — 99215 OFFICE O/P EST HI 40 MIN: CPT | Mod: 95,,, | Performed by: NURSE PRACTITIONER

## 2024-12-30 PROCEDURE — 1159F MED LIST DOCD IN RCRD: CPT | Mod: CPTII,95,, | Performed by: NURSE PRACTITIONER

## 2024-12-30 PROCEDURE — 1160F RVW MEDS BY RX/DR IN RCRD: CPT | Mod: CPTII,95,, | Performed by: NURSE PRACTITIONER

## 2024-12-30 RX ORDER — ACETAMINOPHEN 500 MG
4000 TABLET ORAL DAILY
Qty: 60 CAPSULE | Refills: 2 | Status: SHIPPED | OUTPATIENT
Start: 2024-12-30

## 2024-12-30 RX ORDER — CABOTEGRAVIR AND RILPIVIRINE 600-900/3
KIT INTRAMUSCULAR
COMMUNITY
Start: 2024-11-05

## 2024-12-30 NOTE — PROGRESS NOTES
Patient ID: Angel Sweet 39 y.o.     Chief Complaint:   Chief Complaint   Patient presents with    HIV Positive/AIDS        HPI:    The patient location is: [place]  The chief complaint leading to consultation is: HIV disease  Visit type: audiovisual  Face to Face time with patient: 12 minutes  43 minutes of total time spent on the encounter, which includes face to face time and non-face to face time preparing to see the patient (eg, review of tests), Obtaining and/or reviewing separately obtained history, Documenting clinical information in the electronic or other health record, Independently interpreting results (not separately reported) and communicating results to the patient/family/caregiver, or Care coordination (not separately reported).   Each patient to whom he or she provides medical services by telemedicine is:  (1) informed of the relationship between the physician and patient and the respective role of any other health care provider with respect to management of the patient; and (2) notified that he or she may decline to receive medical services by telemedicine and may withdraw from such care at any time.    Notes:   Angel is a 39 yr old BM who presents for routine follow up visit for HIV disease.  He is now on Cabenuva injections with no issue.  Last injection 11/5/2024.  Next due on 1/7/2025.  Patient states he will be there for injection.  He reports when he first started the injections he had a lot of soreness at the injection site, but upon last injection there were no issues with this.  Last CD4 646 (39%) with viral suppression.  Pt remains sexually active with same partner of 9 years.  Denies need for STI screening at this time. Patient was due for anal pap, but will defer to next visit as this is virtual.  Pt with no current complaints today.  Last Vitamin D level 47.  Takes daily vitamin D supplement.  Patient tells me pharmacy is still sending RX for Biktarvy to his home.  Will check on  this.  Patient admits to daily ETOH use.  States he normallly has at least 1 glass wine daily, but will also add in hard liquor (mostly vodka - 3 to 4 glasses when he has some).  He reports he is drinking more lately with Corky Gras approaching. Denies tobacco / illicit drug use.  Due for flu vaccine to which patient is amendable; will order for shot clinic.        7/24/2024 (per Dr AAYUSH Sal / Dr JESSICA Hankins)  Angel Sweet is a 37 y/o M with PMH HIV disease (last Cd4 636 (37 %), VL undetected 04/2024 ) who presents to ID clinic today for follow up visit. The patient is currently taking Biktarvy and endorses 100% compliance.  Patient with no acute events since his last visit.  He denies having any fevers, chills, shortness breath, chest pain, abdominal pain, dysuria, constipation, or diarrhea.  Patient does note having some mild weight gain while on medication.  Patient is interested in switching to Cabenuva injections at this time.  Patient educated on injection schedule and need to get treatments as scheduled.  For the time being, patient told to continue Biktarvy treatments and to have medication on standby in case any emergency arises.       HIV history:  Diagnosed 2016 - CD4 and VL at diagnosis unknown  Risk factor - MSM  History OI - None  History STIs - None  Prior ART:                 - Cabenuva 2024 to present                 - Biktarvy 2018 to 2024                  - Genvoya (2016)                 -2019: viral load 1730, CD4 834  Genotype testing 2019:                 (+) [id number]  HLA- testing unknown  G6PD testing unknown           Past Medical History:   Diagnosis Date    Allergy     Depression     HIV infection         Past Surgical History:   Procedure Laterality Date    LIPOSUCTION  12/01/2022        Social History     Socioeconomic History    Marital status: Single   Tobacco Use    Smoking status: Never    Smokeless tobacco: Never   Substance and Sexual Activity    Alcohol use: Yes     Alcohol/week:  12.0 standard drinks of alcohol     Types: 6 Glasses of wine, 6 Shots of liquor per week    Drug use: Never    Sexual activity: Yes     Partners: Male        Family History   Problem Relation Name Age of Onset    Hypertension Mother Sherryle     Diabetes Mother Sherryle     Hypertension Father Winston     Diabetes Father Winston     No Known Problems Sister April     No Known Problems Sister Jose     No Known Problems Brother Garcia         Review of patient's allergies indicates:  No Known Allergies     Immunization History   Administered Date(s) Administered    COVID-19, MRNA, LN-S, PF (Pfizer) (Purple Cap) 03/10/2021, 03/31/2021    DTP 1987, 02/01/1988, 09/01/1988, 02/13/1989, 09/01/1992    HIB 07/26/1990    HPV 9-Valent 06/12/2023, 04/23/2024    Hepatitis A / Hepatitis B 03/27/2019, 07/17/2019    Hepatitis B 03/12/1998    Hepatitis B, Pediatric/Adolescent 11/06/1997, 12/11/1997, 09/13/2004, 05/17/2005    Influenza - Quadrivalent 09/28/2016, 01/20/2022    Influenza - Quadrivalent - PF *Preferred* (6 months and older) 11/19/2019    Influenza - Trivalent - Fluarix, Flulaval, Fluzone, Afluria - PF 10/09/2015, 11/19/2019    MMR 02/13/1989, 09/01/1992    Meningococcal Conjugate (MCV4O) 2 Vial (2mo-55yr) 03/27/2019, 07/17/2019    Meningococcal Conjugate (MCV4P) 01/23/2008    OPV 1987, 02/01/1988, 02/13/1989, 09/01/1992    Pneumococcal Conjugate - 13 Valent 02/20/2015    Pneumococcal Polysaccharide - 23 Valent 07/16/2015, 01/20/2022    Td - PF (ADULT) 12/11/1997, 09/13/2004    Tdap 06/11/2020    Varicella 09/13/2004, 05/17/2005        Review of Systems   Constitutional:  Negative for chills, diaphoresis, fever and malaise/fatigue.   HENT:  Negative for congestion, ear pain, hearing loss and sore throat.    Eyes:  Negative for blurred vision, photophobia and pain.   Respiratory:  Negative for cough, hemoptysis, sputum production and shortness of breath.    Cardiovascular:  Negative for chest pain,  palpitations and leg swelling.   Gastrointestinal:  Negative for abdominal pain, constipation, diarrhea, nausea and vomiting.   Genitourinary:  Negative for dysuria, flank pain, frequency, hematuria and urgency.   Musculoskeletal:  Negative for back pain, joint pain, myalgias and neck pain.   Skin:  Negative for itching and rash.   Neurological:  Negative for dizziness, weakness and headaches.   Endo/Heme/Allergies:  Does not bruise/bleed easily.   Psychiatric/Behavioral:  Negative for depression and hallucinations.           Objective:      There were no vitals taken for this visit.     Physical Exam  Constitutional:       General: He is not in acute distress.     Appearance: Normal appearance. He is normal weight. He is not ill-appearing, toxic-appearing or diaphoretic.      Comments: Assessment limited d/t virtual video visit   HENT:      Head: Normocephalic and atraumatic.      Nose: Nose normal.   Eyes:      General: No scleral icterus.     Conjunctiva/sclera: Conjunctivae normal.      Pupils: Pupils are equal, round, and reactive to light.   Pulmonary:      Effort: Pulmonary effort is normal. No respiratory distress.      Breath sounds: No wheezing.   Abdominal:      General: Abdomen is flat. There is no distension.      Palpations: Abdomen is soft.      Tenderness: There is no guarding.   Musculoskeletal:         General: No swelling, deformity or signs of injury. Normal range of motion.      Cervical back: Normal range of motion and neck supple. No rigidity.   Skin:     General: Skin is dry.      Coloration: Skin is not jaundiced or pale.      Findings: No bruising, erythema, lesion or rash.   Neurological:      General: No focal deficit present.      Mental Status: He is alert and oriented to person, place, and time. Mental status is at baseline.   Psychiatric:         Mood and Affect: Mood normal.         Behavior: Behavior normal.         Thought Content: Thought content normal.         Judgment: Judgment  normal.          Labs:   Lab Results   Component Value Date    WBC 5.00 11/05/2024    HGB 14.0 11/05/2024    HCT 41.5 (L) 11/05/2024    MCV 90.8 11/05/2024     11/05/2024       CMP  Sodium   Date Value Ref Range Status   07/24/2024 139 136 - 145 mmol/L Final     Potassium   Date Value Ref Range Status   07/24/2024 3.8 3.5 - 5.1 mmol/L Final     Chloride   Date Value Ref Range Status   07/24/2024 104 98 - 107 mmol/L Final     CO2   Date Value Ref Range Status   07/24/2024 29 22 - 29 mmol/L Final     Blood Urea Nitrogen   Date Value Ref Range Status   07/24/2024 14.2 8.9 - 20.6 mg/dL Final     Creatinine   Date Value Ref Range Status   07/24/2024 1.02 0.73 - 1.18 mg/dL Final     Calcium   Date Value Ref Range Status   07/24/2024 9.8 8.4 - 10.2 mg/dL Final     Albumin   Date Value Ref Range Status   07/24/2024 4.1 3.5 - 5.0 g/dL Final     Bilirubin Total   Date Value Ref Range Status   07/24/2024 0.5 <=1.5 mg/dL Final     ALP   Date Value Ref Range Status   07/24/2024 35 (L) 40 - 150 unit/L Final     AST   Date Value Ref Range Status   07/24/2024 12 5 - 34 unit/L Final     ALT   Date Value Ref Range Status   07/24/2024 11 0 - 55 unit/L Final     eGFR   Date Value Ref Range Status   07/24/2024 >60 mL/min/1.73/m2 Final     Lab Results   Component Value Date    TSH 0.795 07/24/2024     Hep C Ab Interp   Date Value Ref Range Status   04/23/2024 Nonreactive Nonreactive Final     Syphilis Antibody   Date Value Ref Range Status   04/23/2024 Nonreactive Nonreactive, Equivocal Final     RPR   Date Value Ref Range Status   04/26/2017 Non-Reactive >Non-Reactive Final     Cholesterol Total   Date Value Ref Range Status   07/24/2024 167 <=200 mg/dL Final     HDL Cholesterol   Date Value Ref Range Status   07/24/2024 72 (H) 35 - 60 mg/dL Final     Triglyceride   Date Value Ref Range Status   07/24/2024 71 34 - 140 mg/dL Final     Cholesterol/HDL Ratio   Date Value Ref Range Status   07/24/2024 2 0 - 5 Final     Very Low  Density Lipoprotein   Date Value Ref Range Status   07/24/2024 14  Final     LDL Cholesterol   Date Value Ref Range Status   07/24/2024 81.00 50.00 - 140.00 mg/dL Final     Vitamin D   Date Value Ref Range Status   07/24/2024 47 30 - 80 ng/mL Final     Results for orders placed or performed in visit on 06/12/23   CD4 Lymphocytes   Result Value Ref Range    Patient Age 37     WBC Absolute 5,110 4,500 - 11,500 /mm3    Lymph Percent 47.9 28 - 48 %    Lymph Absolute 2,447.69 1,260 - 5,520 x10(3)/mcL    CD4 % 31.9 %    CD4 Absolute 780.31032 589 - 1,505 unit/L    T Cell Interp       Normal absolute lymphocyte count with normal absolute CD4+ lymphocyte count.    Roger Briones M.D.        Results for orders placed or performed in visit on 11/05/24   HIV-1 RNA, Quantitative, PCR with Reflex to Genotype   Result Value Ref Range    HIV-1 RNA Detect/Quant, P Undetected Undetected copies/mL     Results for orders placed or performed in visit on 04/23/24   Quantiferon Gold TB   Result Value Ref Range    QuantiFERON-Tb Gold Plus Result Negative Negative    TB1 Ag minus Nil Result -0.01 IU/mL    TB2 Ag minus Nil Result -0.01 IU/mL    Mitogen minus Nil Result 7.37 IU/mL    Nil Result 0.02 IU/mL     Results for orders placed or performed in visit on 07/24/24   C.trach/N.gonor AMP RNA    Specimen: Throat   Result Value Ref Range    Source THROAT     Chlamydia trachomatis amplified RNA Negative Negative    Neisseria gonorrhoeae amplified RNA Negative Negative    SOURCE: THROAT      Results for orders placed or performed in visit on 04/23/24   Urinalysis   Result Value Ref Range    Color, UA Colorless (A) Yellow, Light-Yellow, Dark Yellow, Eva, Straw    Appearance, UA Clear Clear    Specific Gravity, UA 1.019 1.005 - 1.030    pH, UA 6.5 5.0 - 8.5    Protein, UA Negative Negative    Glucose, UA Normal Negative, Normal    Ketones, UA Negative Negative    Blood, UA Negative Negative    Bilirubin, UA Negative Negative     Urobilinogen, UA Normal 0.2, 1.0, Normal    Nitrites, UA Negative Negative    Leukocyte Esterase, UA Negative Negative    WBC, UA 0-5 None Seen, 0-2, 3-5, 0-5 /HPF    Bacteria, UA None Seen None Seen /HPF    Squamous Epithelial Cells, UA None Seen None Seen /HPF    Hyaline Casts, UA None Seen None Seen /lpf    RBC, UA 0-5 None Seen, 0-2, 3-5, 0-5 /HPF       Imaging: Reviewed most recent relevant imaging studies available, notable results highlighted in this note    Medications:     Current Outpatient Medications   Medication Instructions    CABENUVA 600 mg/3 mL- 900 mg/3 mL injection Every 8 weeks    cholecalciferol (vitamin D3) (VITAMIN D3) 4,000 Units, Oral, Daily, For low vitamin D level    hydrOXYzine pamoate (VISTARIL) 25 mg, Oral, 3 times daily       Assessment:       Problem List Items Addressed This Visit          Psychiatric    Alcohol use       ID    HIV disease - Primary    Relevant Orders    CBC Auto Differential    CD4 Lymphocytes    Comprehensive Metabolic Panel    HIV-1 RNA, Quantitative, PCR with Reflex to Genotype    Glucose-6-Phosphate Dehydrogenase    Pharmacogenomics Panel       Endocrine    Vitamin D deficiency    Relevant Medications    cholecalciferol, vitamin D3, (VITAMIN D3) 50 mcg (2,000 unit) Cap capsule    Other Relevant Orders    Vitamin D     Other Visit Diagnoses       Cancer screening        Need for vaccination        Relevant Medications    influenza (Flulaval, Fluzone, Fluarix) 45 mcg/0.5 mL IM vaccine (> or = 6 mo) 0.5 mL (Start on 12/31/2024 12:00 AM)               Plan:      HIV disease  -     CBC Auto Differential; Future; Expected date: 12/30/2024  -     CD4 Lymphocytes; Future; Expected date: 12/30/2024  -     Comprehensive Metabolic Panel; Future; Expected date: 12/30/2024  -     HIV-1 RNA, Quantitative, PCR with Reflex to Genotype; Future; Expected date: 12/30/2024  -     Glucose-6-Phosphate Dehydrogenase; Future; Expected date: 12/30/2024  -     Pharmacogenomics Panel;  Future; Expected date: 12/30/2024  Last CD4 646 (39%) with viral suppression.  Continue Cabenuva injections q 2 months; next due 1/7/2025  Discussed HIV status at length to include need for 100% medication compliance and adherence, along with safe sex counseling performed.  Patient voiced understanding to both.  Will check labs today to include CD4, viral load, CBC and CM, along with G6PD and Pharmacogenomics panel for HLA  as these results have not been found.  Discussed importance of scheduled follow up as well.   RTC in 2 months with Dr Hankins      Vitamin D deficiency  -     Vitamin D; Future; Expected date: 12/30/2024  -     cholecalciferol, vitamin D3, (VITAMIN D3) 50 mcg (2,000 unit) Cap capsule; Take 2 capsules (4,000 Units total) by mouth once daily. For low vitamin D level  Dispense: 60 capsule; Refill: 2  Last level 47  Continue daily vitamin D supplement    Cancer screening   Due for anal pap next visit    Alcohol use   Alcohol cessation encouraged    Need for vaccination  -     influenza (Flulaval, Fluzone, Fluarix) 45 mcg/0.5 mL IM vaccine (> or = 6 mo) 0.5 mL   Due for flu vaccine - schedule for shot clinic         АННА Humphreys  [facility] Infectious Diseases

## 2024-12-30 NOTE — TELEPHONE ENCOUNTER
Patient needs flu shot - please schedule for shot clinic  He comes in for Cabenuva, but no data provided to say they can be given together so would schedule the 2 injections for different days at this time.   He will also need a 2 month follow up with Dr Hankins   Thank you

## 2025-01-07 ENCOUNTER — CLINICAL SUPPORT (OUTPATIENT)
Dept: INFECTIOUS DISEASES | Facility: CLINIC | Age: 40
End: 2025-01-07
Payer: MEDICAID

## 2025-01-07 DIAGNOSIS — B20 HIV DISEASE: Primary | ICD-10-CM

## 2025-01-07 PROCEDURE — 96372 THER/PROPH/DIAG INJ SC/IM: CPT | Mod: PBBFAC

## 2025-01-07 RX ADMIN — CABOTEGRAVIR AND RILPIVIRINE 6 ML: KIT at 09:01

## 2025-01-07 NOTE — PROGRESS NOTES
Cabenuva criteria:       Does the patient meet all of the below:  Weighs at least 77 pounds (35 kg)? Yes     Pt is NOT currently pregnant or planning to conceive? NA     Virally suppressed (HIV-1 RNA <50 copies/mL) with 2 VL <50 c/mL in the past 12 months w/ 1 value in the last 6 months)? Yes     Patient agreeable to receive 2 IM gluteal injections once monthly for 2 doses and then every 2 months thereafter? Yes     This patient does NOT have any dermatological condition or previous procedures (including implants or fillers) that interfere with the ability to receive ventro- or dorsogluteal injections, and is willing to not get deep tissue massages in the injection site area? Yes     This patient does NOT take any contraindicated medications (carbamazepine, oxcarbazepine, phenobarbital, phenytoin, rifabutin, rifampin, rifapentine, continued systemic dexamethasone, st. Johns wort, omeprazole, esomeprazole): Yes     The patient does NOT have an active Hepatitis B infection: Yes     The patient does NOT have any mutations/resistance or previous virologic failures? Yes     The patient does NOT have any known allergies or tolerance issues with rilpivirine nor cabotegravir? Yes     Reasons for patient/provider wanting Cabenuva:  Pill burden         Reviewed Cabenuva medication criteria to deem patient eligible for injection. Discussed with patient at each visit to make certain maintained eligibility.      Performed medication interaction check prior to administration consideration from active medication list.      Performed HIV resistance testing history review, then entered resistance history of non-sense mutations via [facility] HIV drug resistance database if information available prior to initiation of overall therapy.     Instructed patient to discontinue current ART oral therapy day prior to Cabenuva administration.     Counseled patient on importance of maintaining visits to clinic for injectable  medication.   If patient does not maintain visits, will discuss with provider to restart an oral regimen.      Counseled patient on adverse reactions of injectable to include injection site reaction/rash     Follow up to clinic 1 month post initial injection then once every 2 months after completed initial series  .   Confirmed prior authorization from payor source prior to injection.     Patients last CD4 and date: 720; 7-24-24     Patients last Viral load and date: 137 on 9-3-24 (repeated testing, VL sent to another lab and their value states undetectable at 137 level)     Prior to initial injection Baseline STD testing completed     Female at age of reproductions: negative pregnancy test NA     Will offer STI screening each visit. None needed at this time     A viral load will be assessed (unless otherwise specified by the provider) at the time of the 1st injection (i.e. if oral lead-in) and/or between 1 and 3 months after 1st injection.         Month 6 injection (4th in series). Due visit with provider and myself next visit. Other than that, no complaints from patient. Suggested Flu vaccine. Unfortunately no data on co-administration of Cabenuva injection and Flu vaccine. Though believe to be safe, uncertain and recommended waiting 7 to 14 days to receive a flu vaccine.         Acquired medication from outpatient pharmacy (clear bag, patient supplied med)  RX# ___[id number]_____     Rene Linares, Pharm D

## 2025-01-07 NOTE — PROGRESS NOTES
Cabenuva injections administered to bilateral buttocks. Tolerated well. Rilpivirine- right, cabotegravir- left.

## 2025-01-08 ENCOUNTER — TELEPHONE (OUTPATIENT)
Dept: INFECTIOUS DISEASES | Facility: CLINIC | Age: 40
End: 2025-01-08
Payer: MEDICAID

## 2025-01-08 NOTE — LETTER
January 13, 2025        Angel Sweet  [address]  [facility] [address]             [facility]  [address] [facility] [address]  Phone: [phone]   Patient: Angel Sweet   MR Number: [id number]   YOB: 1985   Date of Visit: 1/8/2025     Dear Dr. Sweet,     I have attempted to reach you by phone and MyChart. Can you call the office concerning your lab work needed to continue the Cabenuva injections.    Sincerely,    Corie De La Rosa, FNP  Nurse Елена ACKERMAN  No Recipients    Enclosure

## 2025-01-08 NOTE — TELEPHONE ENCOUNTER
Patient came in yesterday for his Cabenuva injection.  He was supposed to do labs and left the hospital without doing them.  I called to reminded him yesterday afternoon.    He stated he forgot and he was going to go to an [facility] facility near his home and do them yesterday.    Patient still has not done lab work.  Please call him and remind him to do labs today.  Stress importance of labs  Thank you

## 2025-02-24 ENCOUNTER — PATIENT MESSAGE (OUTPATIENT)
Dept: INFECTIOUS DISEASES | Facility: CLINIC | Age: 40
End: 2025-02-24
Payer: MEDICAID

## 2025-03-06 ENCOUNTER — OFFICE VISIT (OUTPATIENT)
Dept: INFECTIOUS DISEASES | Facility: CLINIC | Age: 40
End: 2025-03-06
Payer: MEDICAID

## 2025-03-06 ENCOUNTER — LAB VISIT (OUTPATIENT)
Dept: LAB | Facility: HOSPITAL | Age: 40
End: 2025-03-06
Payer: MEDICAID

## 2025-03-06 ENCOUNTER — TELEPHONE (OUTPATIENT)
Dept: INFECTIOUS DISEASES | Facility: CLINIC | Age: 40
End: 2025-03-06

## 2025-03-06 VITALS
DIASTOLIC BLOOD PRESSURE: 78 MMHG | RESPIRATION RATE: 17 BRPM | SYSTOLIC BLOOD PRESSURE: 120 MMHG | WEIGHT: 192.88 LBS | HEIGHT: 70 IN | HEART RATE: 69 BPM | BODY MASS INDEX: 27.61 KG/M2 | TEMPERATURE: 98 F

## 2025-03-06 DIAGNOSIS — B20 HIV DISEASE: ICD-10-CM

## 2025-03-06 DIAGNOSIS — Z11.3 ROUTINE SCREENING FOR STI (SEXUALLY TRANSMITTED INFECTION): ICD-10-CM

## 2025-03-06 DIAGNOSIS — B20 HIV DISEASE: Primary | ICD-10-CM

## 2025-03-06 LAB
BASOPHILS # BLD AUTO: 0.04 X10(3)/MCL
BASOPHILS NFR BLD AUTO: 0.7 %
EOSINOPHIL # BLD AUTO: 0.02 X10(3)/MCL (ref 0–0.9)
EOSINOPHIL NFR BLD AUTO: 0.4 %
ERYTHROCYTE [DISTWIDTH] IN BLOOD BY AUTOMATED COUNT: 13.3 % (ref 11.5–17)
HCT VFR BLD AUTO: 41.6 % (ref 42–52)
HGB BLD-MCNC: 14 G/DL (ref 14–18)
IMM GRANULOCYTES # BLD AUTO: 0.01 X10(3)/MCL (ref 0–0.04)
IMM GRANULOCYTES NFR BLD AUTO: 0.2 %
LYMPHOCYTES # BLD AUTO: 2.31 X10(3)/MCL (ref 0.6–4.6)
LYMPHOCYTES NFR BLD AUTO: 43.3 %
MCH RBC QN AUTO: 30.1 PG (ref 27–31)
MCHC RBC AUTO-ENTMCNC: 33.7 G/DL (ref 33–36)
MCV RBC AUTO: 89.5 FL (ref 80–94)
MONOCYTES # BLD AUTO: 0.35 X10(3)/MCL (ref 0.1–1.3)
MONOCYTES NFR BLD AUTO: 6.6 %
NEUTROPHILS # BLD AUTO: 2.61 X10(3)/MCL (ref 2.1–9.2)
NEUTROPHILS NFR BLD AUTO: 48.8 %
NRBC BLD AUTO-RTO: 0 %
PLATELET # BLD AUTO: 255 X10(3)/MCL (ref 130–400)
PMV BLD AUTO: 9.7 FL (ref 7.4–10.4)
RBC # BLD AUTO: 4.65 X10(6)/MCL (ref 4.7–6.1)
WBC # BLD AUTO: 5.34 X10(3)/MCL (ref 4.5–11.5)

## 2025-03-06 PROCEDURE — 85025 COMPLETE CBC W/AUTO DIFF WBC: CPT

## 2025-03-06 PROCEDURE — 96372 THER/PROPH/DIAG INJ SC/IM: CPT | Mod: PBBFAC

## 2025-03-06 PROCEDURE — 86359 T CELLS TOTAL COUNT: CPT

## 2025-03-06 PROCEDURE — 86480 TB TEST CELL IMMUN MEASURE: CPT

## 2025-03-06 PROCEDURE — 99213 OFFICE O/P EST LOW 20 MIN: CPT | Mod: PBBFAC

## 2025-03-06 PROCEDURE — 87536 HIV-1 QUANT&REVRSE TRNSCRPJ: CPT

## 2025-03-06 RX ADMIN — CABOTEGRAVIR AND RILPIVIRINE 6 ML: KIT at 10:03

## 2025-03-06 NOTE — TELEPHONE ENCOUNTER
Anal pap collected in the wrong container. Per Dr Cr schafreay to repeat in 2 months at nurse visit.  Sent pt message ok to recollect in 2 months.

## 2025-03-06 NOTE — PROGRESS NOTES
[facility] Infectious Diseases Clinic Note    Subjective:      MICAH Sweet is a 37 y/o M with PMH HIV disease (last Cd4 646 (39 %), VL undetected 11/2024 ) who presents to ID clinic today for follow up visit.  He is currently on Cabenuva injections and is tolerating well.  Only complaint is mild slight tenderness with injections.  He is compliant with his medications.  He is currently sexually active with the same partner over the past 10 years.  He would like to continue with Cabenuva injections at this time, he is compliant with injections.  Labs from 11/05/2024: Viral load undetectable, CD4 646.      HIV history:  Diagnosed 2016 - CD4 and VL at diagnosis unknown  Risk factor - MSM  History OI - None  History STIs - None  Prior ART:                 - Genvoya (2016)                 - started on Biktarvy (2018)   - Cabenuva 2024 to present                 -2019: viral load 1730, CD4 834  Genotype testing 2019:                 (+) K103R  HLA- testing unknown  G6PD testing unknown      Labs  HIV Viral Load: check q3-6 months  HIV VL: Undetectable, Date: 11/2025     CD4 Count: check q3-6 months  Lab Results   Component Value Date    WBCABSOLUTE 5,110 06/12/2023    LYMPHPERCENT 47.9 06/12/2023    LYMPHABSOLUT 2,447.69 06/12/2023    SF1EFLMMKTQ [phone] 06/12/2023    TCELLINTERP  06/12/2023     Normal absolute lymphocyte count with normal absolute CD4+ lymphocyte count.    Roger Briones M.D.        CBC with Differential: check q3-6 months  Lab Results   Component Value Date    WBC 5.00 11/05/2024    RBC 4.57 (L) 11/05/2024    HGB 14.0 11/05/2024    HCT 41.5 (L) 11/05/2024    MCV 90.8 11/05/2024    MCH 30.6 11/05/2024    MCHC 33.7 11/05/2024    RDW 13.0 11/05/2024     11/05/2024    MPV 10.6 (H) 11/05/2024     CMP: check q3-6 months  Lab Results   Component Value Date     07/24/2024    K 3.8 07/24/2024     07/24/2024    GLUCOSE 96 07/24/2024    BUN 14.2 07/24/2024    CREATININE 1.02  "07/24/2024    CALCIUM 9.8 07/24/2024    BILITOT 0.5 07/24/2024    AST 12 07/24/2024    ALT 11 07/24/2024    ALKPHOS 35 (L) 07/24/2024    ALBUMIN 4.1 07/24/2024     Quantiferon: check q1yr  TB Gold: Not Detected, Date: 4/2024    Syphilis IgG: check q1yr, unless Hx syphilis then RPR titer q1yr  Lab Results   Component Value Date    SYPHAB Nonreactive 04/23/2024    LABRPR Non-Reactive 04/26/2017     Toxoplasmosis IgG: check once  No results found for: "TXIGMS", "TOXIGGS", "TOXIGGVL"  Gonorrhea: check q1yr  Lab Results   Component Value Date    NGONNO Not Detected 07/24/2024    LABCHLAPCR Not Detected 07/24/2024     Lipid Panel: check q1yr  Lab Results   Component Value Date    TRIG 71 07/24/2024    CHOL 167 07/24/2024    HDL 72 (H) 07/24/2024    VLDL 14 07/24/2024     A1C: check q1yr  Lab Results   Component Value Date    HGBA1C 5.4 07/24/2024     Urinalysis: check q1yr, check q6mo if taking tenofovir  Lab Results   Component Value Date    PROTEINUA Negative 07/24/2024    UROBILINOGEN Normal 07/24/2024    KETONESU Negative 01/20/2022    NITRITE Negative 07/24/2024    LEUKOCYTESUR Negative 07/24/2024    COLORU Light-Yellow 07/24/2024    RBCUA 0-5 07/24/2024     Glucose-6 Phosphate Dehydrogenase: check once  No results found for: "Z0KRSDC"  Hepatitis A IgG: check once  Lab Results   Component Value Date    HEPAIGG Reactive (A) 11/15/2022     Hepatitis B Surface Antibody: check once  Lab Results   Component Value Date    HEPBSAB Reactive (A) 04/23/2024    HBSABQUANT 24,802.54 04/23/2024     Hepatitis C Antibody: check q1yr if high risk, once if low risk  Lab Results   Component Value Date    HEPCAB Nonreactive 04/23/2024         Vaccines  Immunization History   Administered Date(s) Administered    COVID-19, MRNA, LN-S, PF (Pfizer) (Purple Cap) 03/10/2021, 03/31/2021    DTP 1987, 02/01/1988, 09/01/1988, 02/13/1989, 09/01/1992    HIB 07/26/1990    HPV 9-Valent 06/12/2023, 04/23/2024    Hepatitis A / Hepatitis B " "03/27/2019, 07/17/2019    Hepatitis B 03/12/1998    Hepatitis B, Pediatric/Adolescent 11/06/1997, 12/11/1997, 09/13/2004, 05/17/2005    Influenza - Quadrivalent 09/28/2016, 01/20/2022    Influenza - Quadrivalent - PF *Preferred* (6 months and older) 11/19/2019    Influenza - Trivalent - Fluarix, Flulaval, Fluzone, Afluria - PF 10/09/2015, 11/19/2019    MMR 02/13/1989, 09/01/1992    Meningococcal Conjugate (MCV4O) 2 Vial (2mo-55yr) 03/27/2019, 07/17/2019    Meningococcal Conjugate (MCV4P) 01/23/2008    OPV 1987, 02/01/1988, 02/13/1989, 09/01/1992    Pneumococcal Conjugate - 13 Valent 02/20/2015    Pneumococcal Polysaccharide - 23 Valent 07/16/2015, 01/20/2022    Td - PF (ADULT) 12/11/1997, 09/13/2004    Tdap 06/11/2020    Varicella 09/13/2004, 05/17/2005       Imaging  DEXA Scan: if age>50, check q10yrs  No results found for this or any previous visit.    Pathology:  Pap Smear: if female and age >21 check cervical pap q1yr, if male and MSM check rectal pap q1yr  No results found for: "PAP"    ASCVD Risk Score:  Consider high-intensity statin therapy if 10-year ASCVD risk score >7.5%  The ASCVD Risk score (Kori DK, et al., 2019) failed to calculate for the following reasons:    The 2019 ASCVD risk score is only valid for ages 40 to 79    Review of Systems   Constitutional: Negative.    HENT: Negative.     Eyes: Negative.    Respiratory: Negative.     Cardiovascular: Negative.    Gastrointestinal: Negative.    Genitourinary: Negative.    Musculoskeletal: Negative.    Skin: Negative.    Neurological: Negative.    Endo/Heme/Allergies: Negative.        The following portions of the patient's history were reviewed and updated as appropriate: allergies, current medications, past family history, past medical history, past social history, past surgical history and problem list.    Objective:   Vitals:  /78   Pulse 69   Temp 97.9 °F (36.6 °C)   Resp 17   Ht 5' 10" (1.778 m)   Wt 87.5 kg (192 lb 14.4 oz)   " BMI 27.68 kg/m²  Body mass index is 27.68 kg/m².    Gen: awake, alert, NAD  HEENT: NC/AT, EOMi, anicteric sclera, no rhinorrhea, OP clear  Neck: no cervical LAD  CV: regular rate normal rhythm no murmur  Resp: easy WOB on RA CTABL no w/r/r  Abd: +BS, soft, NTTP, no HSM  Ext: warm, no LE edema  Skin: no rash  Neuro: no focal deficits       Assessment      1) HIV disease, controlled on ART. Most recent CD4 646 (39 %) and VL undetected on 11/05/2024  2) Healthcare maintenance   3) Need for anal pap smear    Plan    -patient is compliant with Cabenuva.  Continue with injections every 2 months.  -last labs 11/05/2024:  CD4 646, viral load undetectable.  -last QuantiFERON gold 04/2024.  Repeat QuantiFERON gold ordered.  Continue with annual screening.  -anal Pap smear 04/2024 with insufficient sample.  Repeat anal Pap smear today.  -repeat labs ordered including viral load, CD4, CBC, CMP, QuantiFERON gold.  - Educated on importance of 100% medication compliance    RTC 4 months      Erik Nguyen MD  [employer] [facility], PGY-2

## 2025-03-06 NOTE — PROGRESS NOTES
Cabenuva criteria:       Does the patient meet all of the below:  Weighs at least 77 pounds (35 kg)? Yes     Pt is NOT currently pregnant or planning to conceive? NA     Virally suppressed (HIV-1 RNA <50 copies/mL) with 2 VL <50 c/mL in the past 12 months w/ 1 value in the last 6 months)? Yes     Patient agreeable to receive 2 IM gluteal injections once monthly for 2 doses and then every 2 months thereafter? Yes     This patient does NOT have any dermatological condition or previous procedures (including implants or fillers) that interfere with the ability to receive ventro- or dorsogluteal injections, and is willing to not get deep tissue massages in the injection site area? Yes     This patient does NOT take any contraindicated medications (carbamazepine, oxcarbazepine, phenobarbital, phenytoin, rifabutin, rifampin, rifapentine, continued systemic dexamethasone, st. Johns wort, omeprazole, esomeprazole): Yes     The patient does NOT have an active Hepatitis B infection: Yes     The patient does NOT have any mutations/resistance or previous virologic failures? Yes     The patient does NOT have any known allergies or tolerance issues with rilpivirine nor cabotegravir? Yes     Reasons for patient/provider wanting Cabenuva:  Pill burden         Reviewed Cabenuva medication criteria to deem patient eligible for injection. Discussed with patient at each visit to make certain maintained eligibility.      Performed medication interaction check prior to administration consideration from active medication list.      Performed HIV resistance testing history review, then entered resistance history of non-sense mutations via [facility] HIV drug resistance database if information available prior to initiation of overall therapy.     Instructed patient to discontinue current ART oral therapy day prior to Cabenuva administration.     Counseled patient on importance of maintaining visits to clinic for injectable  medication.   If patient does not maintain visits, will discuss with provider to restart an oral regimen.      Counseled patient on adverse reactions of injectable to include injection site reaction/rash     Follow up to clinic 1 month post initial injection then once every 2 months after completed initial series  .   Confirmed prior authorization from payor source prior to injection.     Patients last CD4 and date: 720; 7-24-24     Patients last Viral load and date: 137 on 9-3-24 (repeated testing, VL sent to another lab and their value states undetectable at 137 level)     Prior to initial injection Baseline STD testing completed     Female at age of reproductions: negative pregnancy test NA     Will offer STI screening each visit. None needed at this time     A viral load will be assessed (unless otherwise specified by the provider) at the time of the 1st injection (i.e. if oral lead-in) and/or between 1 and 3 months after 1st injection.         Month 8 injection (5th in series). Next visit in 2 months for nurse/myself.  Other than that, no complaints from patient.       Rene Linares, Pharm D.     Acquired medication from outpatient pharmacy (clear bag, patient supplied med)  RX# ___[id number]_____

## 2025-03-08 LAB — HIV1 RNA # PLAS NAA DL=20: <20 COPIES/ML

## 2025-03-10 LAB
GAMMA INTERFERON BACKGROUND BLD IA-ACNC: 0.03 IU/ML
M TB IFN-G BLD-IMP: NEGATIVE
M TB IFN-G CD4+ BCKGRND COR BLD-ACNC: 0.01 IU/ML
M TB IFN-G CD4+CD8+ BCKGRND COR BLD-ACNC: 0 IU/ML
MITOGEN IGNF BCKGRD COR BLD-ACNC: 9.97 IU/ML

## 2025-03-17 ENCOUNTER — RESULTS FOLLOW-UP (OUTPATIENT)
Dept: INFECTIOUS DISEASES | Facility: HOSPITAL | Age: 40
End: 2025-03-17

## 2025-03-19 DIAGNOSIS — G47.00 INSOMNIA, UNSPECIFIED TYPE: ICD-10-CM

## 2025-03-20 RX ORDER — HYDROXYZINE PAMOATE 25 MG/1
25 CAPSULE ORAL 3 TIMES DAILY
Qty: 90 CAPSULE | Refills: 2 | Status: SHIPPED | OUTPATIENT
Start: 2025-03-20

## 2025-05-07 ENCOUNTER — CLINICAL SUPPORT (OUTPATIENT)
Dept: INFECTIOUS DISEASES | Facility: CLINIC | Age: 40
End: 2025-05-07
Payer: MEDICAID

## 2025-05-07 VITALS — WEIGHT: 192.88 LBS | BODY MASS INDEX: 27.61 KG/M2 | HEIGHT: 70 IN

## 2025-05-07 DIAGNOSIS — B20 HIV DISEASE: Primary | ICD-10-CM

## 2025-05-07 PROCEDURE — 96372 THER/PROPH/DIAG INJ SC/IM: CPT | Mod: PBBFAC

## 2025-05-07 PROCEDURE — 99211 OFF/OP EST MAY X REQ PHY/QHP: CPT | Mod: PBBFAC

## 2025-05-07 PROCEDURE — 88112 CYTOPATH CELL ENHANCE TECH: CPT

## 2025-05-07 RX ADMIN — CABOTEGRAVIR AND RILPIVIRINE 6 ML: KIT at 10:05

## 2025-05-07 NOTE — PROGRESS NOTES
Cabenuva Injection given in both buttocks (Cabotegravir 600mg/3ml left and Rilpivirine 900mg/3ml right) IM using aseptic tech. Patient tolerated well. Next injection in 2 months. To contact the clinic prn.

## 2025-05-08 NOTE — PROGRESS NOTES
Cabenuva criteria:       Does the patient meet all of the below:  Weighs at least 77 pounds (35 kg)? Yes     Pt is NOT currently pregnant or planning to conceive? NA     Virally suppressed (HIV-1 RNA <50 copies/mL) with 2 VL <50 c/mL in the past 12 months w/ 1 value in the last 6 months)? Yes     Patient agreeable to receive 2 IM gluteal injections once monthly for 2 doses and then every 2 months thereafter? Yes     This patient does NOT have any dermatological condition or previous procedures (including implants or fillers) that interfere with the ability to receive ventro- or dorsogluteal injections, and is willing to not get deep tissue massages in the injection site area? Yes     This patient does NOT take any contraindicated medications (carbamazepine, oxcarbazepine, phenobarbital, phenytoin, rifabutin, rifampin, rifapentine, continued systemic dexamethasone, st. Johns wort, omeprazole, esomeprazole): Yes     The patient does NOT have an active Hepatitis B infection: Yes     The patient does NOT have any mutations/resistance or previous virologic failures? Yes     The patient does NOT have any known allergies or tolerance issues with rilpivirine nor cabotegravir? Yes     Reasons for patient/provider wanting Cabenuva:  Pill burden         Reviewed Cabenuva medication criteria to deem patient eligible for injection. Discussed with patient at each visit to make certain maintained eligibility.      Performed medication interaction check prior to administration consideration from active medication list.      Performed HIV resistance testing history review, then entered resistance history of non-sense mutations via [facility] HIV drug resistance database if information available prior to initiation of overall therapy.     Instructed patient to discontinue current ART oral therapy day prior to Cabenuva administration.     Counseled patient on importance of maintaining visits to clinic for injectable  medication.   If patient does not maintain visits, will discuss with provider to restart an oral regimen.      Counseled patient on adverse reactions of injectable to include injection site reaction/rash     Follow up to clinic 1 month post initial injection then once every 2 months after completed initial series  .   Confirmed prior authorization from payor source prior to injection.     Patients last CD4 and date: 829; 3-6-25     Patients last Viral load and date: <20 on 3-6-25 (repeated testing, VL sent to another lab and their value states undetectable at 137 level)     Prior to initial injection Baseline STD testing completed     Female at age of reproductions: negative pregnancy test NA     Will offer STI screening each visit. None needed at this time     A viral load will be assessed (unless otherwise specified by the provider) at the time of the 1st injection (i.e. if oral lead-in) and/or between 1 and 3 months after 1st injection.         Month 10 injection (6th in series). Next visit in 2 months for provider visit and lab work.  Other than that, no complaints from patient.       Rene Linares, Pharm D.      Acquired medication from outpatient pharmacy (clear bag, patient supplied med)  RX# ___[id number]_____

## 2025-05-09 LAB — PSYCHE PATHOLOGY RESULT: NORMAL

## 2025-05-12 ENCOUNTER — TELEPHONE (OUTPATIENT)
Dept: INFECTIOUS DISEASES | Facility: HOSPITAL | Age: 40
End: 2025-05-12
Payer: MEDICAID

## 2025-05-12 ENCOUNTER — RESULTS FOLLOW-UP (OUTPATIENT)
Dept: INFECTIOUS DISEASES | Facility: HOSPITAL | Age: 40
End: 2025-05-12

## 2025-05-12 NOTE — TELEPHONE ENCOUNTER
Anal pap was rejected due to an inadequate specimen collection. We will have to repeat at his next clinic visit.    Cole Hankins MD  Infectious Diseases Faculty   of Medicine    
Patient self collected anal pap!  Do you want to add an anal pap at next visit?  
standardized patient assessment instrument and/or measurable assessment of functional outcome.    Today's Assessment: See above    Medical Necessity Documentation:  I certify that this patient meets the below criteria necessary for medical necessity for care and/or justification of therapy services:  The patient has functional impairments and/or activity limitations and would benefit from continued outpatient therapy services to address the deficits outlined in the patients goals    Tolerance of evaluation/treatment: Good    Prognosis for POC: [x] Good [] Fair  [] Poor    Patient requires continued skilled intervention: [x] Yes  [] No      CHARGE CAPTURE     PT CHARGE GRID   CPT Code (TIMED) minutes # CPT Code (UNTIMED) #     Therex (93792)     EVAL:LOW (53395 - Typically 20 minutes face-to-face) 1    Neuromusc. Re-ed (84182)    Re-Eval (79021)     Manual (19735)    Estim Unattended (51302)     Ther. Act (48519) 40 3  Mech. Traction (70344)     Gait (15393)    Dry Needle 1-2 muscle (69301)     Aquatic Therex (26717)    Dry Needle 3+ muscle (20561)     Iontophoresis (21699)    VASO (15964)     Ultrasound (14273)    Group Therapy (98975)     Estim Attended (30400)    Canalith Repositioning (89517)     Other:    Other:    Total Timed Code Tx Minutes 40 3  1     Total Treatment Minutes 60        Charge Justification:  (89668) HOME EXERCISE PROGRAM - Reviewed/Progressed HEP activities related to strengthening, flexibility, endurance, ROM performed to prevent loss of range of motion, maintain or improve muscular strength or increase flexibility, following either an injury or surgery.  (24381) THERAPEUTIC ACTIVITY - use of dynamic activities to improve functional performance. (Ex include squatting, ascending/descending stairs, walking, bending, lifting, catching, throwing, pushing, pulling, jumping.)  Direct, one on one contact, billed in 15-minute increments.      GOALS     Patient stated goal: \"stronger core and pelvic

## 2025-05-15 ENCOUNTER — TELEPHONE (OUTPATIENT)
Dept: INFECTIOUS DISEASES | Facility: CLINIC | Age: 40
End: 2025-05-15
Payer: MEDICAID

## 2025-05-15 DIAGNOSIS — B20 HIV DISEASE: Primary | ICD-10-CM

## 2025-05-15 DIAGNOSIS — E55.9 VITAMIN D DEFICIENCY: ICD-10-CM

## 2025-05-15 RX ORDER — BICTEGRAVIR SODIUM, EMTRICITABINE, AND TENOFOVIR ALAFENAMIDE FUMARATE 50; 200; 25 MG/1; MG/1; MG/1
1 TABLET ORAL DAILY
Qty: 90 TABLET | Refills: 0 | Status: CANCELLED | OUTPATIENT
Start: 2025-05-15

## 2025-05-15 RX ORDER — BICTEGRAVIR SODIUM, EMTRICITABINE, AND TENOFOVIR ALAFENAMIDE FUMARATE 50; 200; 25 MG/1; MG/1; MG/1
1 TABLET ORAL DAILY
COMMUNITY

## 2025-05-15 NOTE — TELEPHONE ENCOUNTER
Last visit with Cole Hankins MD: Visit date not found  Last visit in [facility] INFECTIOUS DISEASE: 5/7/2025    Patient's next visit in [facility] INFECTIOUS DISEASE: 7/3/2025     LL 11/05/2024      Please advise on medication refills

## 2025-05-16 RX ORDER — ACETAMINOPHEN 500 MG
4000 TABLET ORAL DAILY
Qty: 60 CAPSULE | Refills: 0 | Status: SHIPPED | OUTPATIENT
Start: 2025-05-16

## 2025-05-16 NOTE — TELEPHONE ENCOUNTER
He is on cabenuva with his last injection being on 5/7. His next injection is on 7/3. He does not need to be taking Biktarvy while on cabenuva, so I am not sure why this medication was reuqested for a refill? I am cancelling the script.    I will refill his vitamin D.    Cole Hankins MD  Infectious Diseases Faculty   of Medicine

## 2025-05-22 RX ORDER — BICTEGRAVIR SODIUM, EMTRICITABINE, AND TENOFOVIR ALAFENAMIDE FUMARATE 50; 200; 25 MG/1; MG/1; MG/1
1 TABLET ORAL DAILY
Qty: 1 TABLET | Refills: 0 | OUTPATIENT
Start: 2025-05-22

## 2025-06-30 ENCOUNTER — TELEPHONE (OUTPATIENT)
Dept: FAMILY MEDICINE | Facility: CLINIC | Age: 40
End: 2025-06-30
Payer: MEDICAID

## 2025-06-30 NOTE — TELEPHONE ENCOUNTER
?? **Pre-Visit Call Screening**       ?? Reason for Visit  Chief complaint:  Follow up, no complaint      ?? Medication Review  - Patient reminded to take BP meds prior to appointment: No  - Patient reminded to bring medications to visit: Yes          ?? Recent Health Events  - Any ER, urgent care, or hospital visits since last appointment: No    - If yes, date & location:       ?? Access and Support Needs  - Transportation issues: No  -  needed:  No

## 2025-07-02 ENCOUNTER — LAB VISIT (OUTPATIENT)
Dept: LAB | Facility: HOSPITAL | Age: 40
End: 2025-07-02
Attending: NURSE PRACTITIONER
Payer: MEDICAID

## 2025-07-02 ENCOUNTER — OFFICE VISIT (OUTPATIENT)
Dept: INFECTIOUS DISEASES | Facility: CLINIC | Age: 40
End: 2025-07-02
Payer: MEDICAID

## 2025-07-02 VITALS
SYSTOLIC BLOOD PRESSURE: 123 MMHG | TEMPERATURE: 98 F | BODY MASS INDEX: 28.21 KG/M2 | HEART RATE: 76 BPM | HEIGHT: 70 IN | WEIGHT: 197.06 LBS | RESPIRATION RATE: 16 BRPM | DIASTOLIC BLOOD PRESSURE: 73 MMHG

## 2025-07-02 DIAGNOSIS — Z12.9 CANCER SCREENING: ICD-10-CM

## 2025-07-02 DIAGNOSIS — Z23 NEED FOR VACCINATION: ICD-10-CM

## 2025-07-02 DIAGNOSIS — E55.9 VITAMIN D DEFICIENCY: ICD-10-CM

## 2025-07-02 DIAGNOSIS — B20 HIV DISEASE: ICD-10-CM

## 2025-07-02 DIAGNOSIS — Z11.3 ROUTINE SCREENING FOR STI (SEXUALLY TRANSMITTED INFECTION): ICD-10-CM

## 2025-07-02 DIAGNOSIS — B20 HIV DISEASE: Primary | ICD-10-CM

## 2025-07-02 DIAGNOSIS — F10.90 ALCOHOL USE: ICD-10-CM

## 2025-07-02 LAB
25(OH)D3+25(OH)D2 SERPL-MCNC: 43 NG/ML (ref 30–80)
ALBUMIN SERPL-MCNC: 4.1 G/DL (ref 3.5–5)
ALBUMIN/GLOB SERPL: 1.1 RATIO (ref 1.1–2)
ALP SERPL-CCNC: 36 UNIT/L (ref 40–150)
ALT SERPL-CCNC: 15 UNIT/L (ref 0–55)
ANION GAP SERPL CALC-SCNC: 6 MEQ/L
AST SERPL-CCNC: 14 UNIT/L (ref 11–45)
BASOPHILS # BLD AUTO: 0.04 X10(3)/MCL
BASOPHILS NFR BLD AUTO: 0.6 %
BILIRUB SERPL-MCNC: 0.3 MG/DL
BUN SERPL-MCNC: 19.7 MG/DL (ref 8.9–20.6)
C TRACH DNA SPEC QL NAA+PROBE: NOT DETECTED
CALCIUM SERPL-MCNC: 9.4 MG/DL (ref 8.4–10.2)
CHLORIDE SERPL-SCNC: 106 MMOL/L (ref 98–107)
CO2 SERPL-SCNC: 29 MMOL/L (ref 22–29)
CREAT SERPL-MCNC: 0.96 MG/DL (ref 0.72–1.25)
CREAT/UREA NIT SERPL: 21
EOSINOPHIL # BLD AUTO: 0.02 X10(3)/MCL (ref 0–0.9)
EOSINOPHIL NFR BLD AUTO: 0.3 %
ERYTHROCYTE [DISTWIDTH] IN BLOOD BY AUTOMATED COUNT: 13.8 % (ref 11.5–17)
GFR SERPLBLD CREATININE-BSD FMLA CKD-EPI: >60 ML/MIN/1.73/M2
GLOBULIN SER-MCNC: 3.8 GM/DL (ref 2.4–3.5)
GLUCOSE SERPL-MCNC: 94 MG/DL (ref 74–100)
HBV SURFACE AG SERPL QL IA: NONREACTIVE
HCT VFR BLD AUTO: 42.2 % (ref 42–52)
HCV AB SERPL QL IA: NONREACTIVE
HGB BLD-MCNC: 14 G/DL (ref 14–18)
IMM GRANULOCYTES # BLD AUTO: 0.01 X10(3)/MCL (ref 0–0.04)
IMM GRANULOCYTES NFR BLD AUTO: 0.2 %
LYMPHOCYTES # BLD AUTO: 2.33 X10(3)/MCL (ref 0.6–4.6)
LYMPHOCYTES NFR BLD AUTO: 35.6 %
MCH RBC QN AUTO: 29.9 PG (ref 27–31)
MCHC RBC AUTO-ENTMCNC: 33.2 G/DL (ref 33–36)
MCV RBC AUTO: 90 FL (ref 80–94)
MONOCYTES # BLD AUTO: 0.34 X10(3)/MCL (ref 0.1–1.3)
MONOCYTES NFR BLD AUTO: 5.2 %
N GONORRHOEA DNA SPEC QL NAA+PROBE: NOT DETECTED
NEUTROPHILS # BLD AUTO: 3.8 X10(3)/MCL (ref 2.1–9.2)
NEUTROPHILS NFR BLD AUTO: 58.1 %
NRBC BLD AUTO-RTO: 0 %
PLATELET # BLD AUTO: 261 X10(3)/MCL (ref 130–400)
PMV BLD AUTO: 9.7 FL (ref 7.4–10.4)
POTASSIUM SERPL-SCNC: 3.7 MMOL/L (ref 3.5–5.1)
PROT SERPL-MCNC: 7.9 GM/DL (ref 6.4–8.3)
RBC # BLD AUTO: 4.69 X10(6)/MCL (ref 4.7–6.1)
SODIUM SERPL-SCNC: 141 MMOL/L (ref 136–145)
SPECIMEN SOURCE: NORMAL
T PALLIDUM AB SER QL: NONREACTIVE
WBC # BLD AUTO: 6.54 X10(3)/MCL (ref 4.5–11.5)

## 2025-07-02 PROCEDURE — 86361 T CELL ABSOLUTE COUNT: CPT

## 2025-07-02 PROCEDURE — 36415 COLL VENOUS BLD VENIPUNCTURE: CPT

## 2025-07-02 PROCEDURE — 99214 OFFICE O/P EST MOD 30 MIN: CPT | Mod: PBBFAC | Performed by: NURSE PRACTITIONER

## 2025-07-02 PROCEDURE — 96372 THER/PROPH/DIAG INJ SC/IM: CPT | Mod: PBBFAC

## 2025-07-02 PROCEDURE — 87491 CHLMYD TRACH DNA AMP PROBE: CPT | Performed by: NURSE PRACTITIONER

## 2025-07-02 PROCEDURE — 87591 N.GONORRHOEAE DNA AMP PROB: CPT | Mod: 59 | Performed by: NURSE PRACTITIONER

## 2025-07-02 PROCEDURE — 82306 VITAMIN D 25 HYDROXY: CPT

## 2025-07-02 PROCEDURE — 86803 HEPATITIS C AB TEST: CPT

## 2025-07-02 PROCEDURE — 87340 HEPATITIS B SURFACE AG IA: CPT

## 2025-07-02 PROCEDURE — 86780 TREPONEMA PALLIDUM: CPT

## 2025-07-02 PROCEDURE — 85025 COMPLETE CBC W/AUTO DIFF WBC: CPT

## 2025-07-02 PROCEDURE — 87536 HIV-1 QUANT&REVRSE TRNSCRPJ: CPT

## 2025-07-02 PROCEDURE — 80053 COMPREHEN METABOLIC PANEL: CPT

## 2025-07-02 RX ORDER — ACETAMINOPHEN 500 MG
4000 TABLET ORAL DAILY
Qty: 60 CAPSULE | Refills: 0 | Status: SHIPPED | OUTPATIENT
Start: 2025-07-02

## 2025-07-02 RX ADMIN — CABOTEGRAVIR AND RILPIVIRINE 6 ML: KIT at 12:07

## 2025-07-02 NOTE — PROGRESS NOTES
Patient ID: Angel Sweet 39 y.o.     Chief Complaint:   Chief Complaint   Patient presents with    HIV Positive/AIDS        HPI:    Angel is a 39 yr old BM MSM who presents for routine HIV visit.  Patient is well controlled on Cabenuva injections every 2 months.  Last CD4 829 (36.1%) with viral suppression.  Patient just broke up with .  Due for yearly routine STI screening to which he is amendable.  Last anal pap was insufficient.  Discussed with patient he requested to defer repeat anal pap until next visit.  Patient with no current complaints.  Denies fever, chills, night sweats, rash, HA, vision changes, dizziness, CP/SOB, cough, N/V/D, abdominal pain, or urinary complaints. Last vitamin D level 41.  Takes daily vitamin D supplement.  Drinks 1 glass of wine daily.  Due for Menveo booster; patient requests to defer to next visit.         03/06/2025 (per Dr Hankins)  Angel Sweet is a 39 y/o M with PMH HIV disease (last Cd4 646 (39 %), VL undetected 11/2024 ) who presents to ID clinic today for follow up visit.  He is currently on Cabenuva injections and is tolerating well.  Only complaint is mild slight tenderness with injections.  He is compliant with his medications.  He is currently sexually active with the same partner over the past 10 years.  He would like to continue with Cabenuva injections at this time, he is compliant with injections.  Labs from 11/05/2024: Viral load undetectable, CD4 646.    12/30/2024  The patient location is: [place]  The chief complaint leading to consultation is: HIV disease  Visit type: audiovisual  Face to Face time with patient: 12 minutes  43 minutes of total time spent on the encounter, which includes face to face time and non-face to face time preparing to see the patient (eg, review of tests), Obtaining and/or reviewing separately obtained history, Documenting clinical information in the electronic or other health record, Independently interpreting results (not  separately reported) and communicating results to the patient/family/caregiver, or Care coordination (not separately reported).   Each patient to whom he or she provides medical services by telemedicine is:  (1) informed of the relationship between the physician and patient and the respective role of any other health care provider with respect to management of the patient; and (2) notified that he or she may decline to receive medical services by telemedicine and may withdraw from such care at any time.     Notes:   Angel is a 39 yr old BM who presents for routine follow up visit for HIV disease.  He is now on Cabenuva injections with no issue.  Last injection 11/5/2024.  Next due on 1/7/2025.  Patient states he will be there for injection.  He reports when he first started the injections he had a lot of soreness at the injection site, but upon last injection there were no issues with this.  Last CD4 646 (39%) with viral suppression.  Pt remains sexually active with same partner of 9 years.  Denies need for STI screening at this time. Patient was due for anal pap, but will defer to next visit as this is virtual.  Pt with no current complaints today.  Last Vitamin D level 47.  Takes daily vitamin D supplement.  Patient tells me pharmacy is still sending RX for Biktarvy to his home.  Will check on this.  Patient admits to daily ETOH use.  States he normallly has at least 1 glass wine daily, but will also add in hard liquor (mostly vodka - 3 to 4 glasses when he has some).  He reports he is drinking more lately with Corky Gras approaching. Denies tobacco / illicit drug use.  Due for flu vaccine to which patient is amendable; will order for shot clinic.          7/24/2024 (per Dr AAYUSH Sal / Dr JESSICA Hankins)  Angel Sweet is a 37 y/o M with PMH HIV disease (last Cd4 636 (37 %), VL undetected 04/2024 ) who presents to ID clinic today for follow up visit. The patient is currently taking Biktarvy and endorses 100% compliance.   Patient with no acute events since his last visit.  He denies having any fevers, chills, shortness breath, chest pain, abdominal pain, dysuria, constipation, or diarrhea.  Patient does note having some mild weight gain while on medication.  Patient is interested in switching to Cabenuva injections at this time.  Patient educated on injection schedule and need to get treatments as scheduled.  For the time being, patient told to continue Biktarvy treatments and to have medication on standby in case any emergency arises.        HIV history:  Diagnosed 2016 - CD4 and VL at diagnosis unknown  Risk factor - MSM  History OI - None  History STIs - None  Prior ART:                 - Cabenuva 2024 to present                 - Biktarvy 2018 to 2024                  - Genvoya (2016)                 -2019: viral load 1730, CD4 834  Genotype testing 2019:                 (+) [id number]  HLA- NEGATIVE  G6PD NORMAL (11.8)           Past Medical History:   Diagnosis Date    Allergy     Depression     HIV infection         Past Surgical History:   Procedure Laterality Date    LIPOSUCTION  12/01/2022        Social History     Socioeconomic History    Marital status: Single   Tobacco Use    Smoking status: Never    Smokeless tobacco: Never   Substance and Sexual Activity    Alcohol use: Yes     Alcohol/week: 12.0 standard drinks of alcohol     Types: 6 Glasses of wine, 6 Shots of liquor per week    Drug use: Never    Sexual activity: Yes     Partners: Male        Family History   Problem Relation Name Age of Onset    Hypertension Mother Sherryle     Diabetes Mother Sherryle     Hypertension Father Winston     Diabetes Father Winston     No Known Problems Sister April     No Known Problems Sister Shanica     No Known Problems Brother Garcia         Review of patient's allergies indicates:  No Known Allergies     Immunization History   Administered Date(s) Administered    COVID-19, MRNA, LN-S, PF (Pfizer) (Purple Cap) 03/10/2021,  03/31/2021    DTP 1987, 02/01/1988, 09/01/1988, 02/13/1989, 09/01/1992    HIB 07/26/1990    HPV 9-Valent 06/12/2023, 04/23/2024    Hepatitis A / Hepatitis B 03/27/2019, 07/17/2019    Hepatitis B 03/12/1998    Hepatitis B, Pediatric/Adolescent 11/06/1997, 12/11/1997, 09/13/2004, 05/17/2005    Influenza - Quadrivalent 09/28/2016, 01/20/2022    Influenza - Quadrivalent - PF *Preferred* (6 months and older) 11/19/2019    Influenza - Trivalent - Fluarix, Flulaval, Fluzone, Afluria - PF 10/09/2015, 11/19/2019    MMR 02/13/1989, 09/01/1992    Meningococcal Conjugate (MCV4O) 2 Vial (2mo-55yr) 03/27/2019, 07/17/2019    Meningococcal Conjugate (MCV4P) 01/23/2008    OPV 1987, 02/01/1988, 02/13/1989, 09/01/1992    Pneumococcal Conjugate - 13 Valent 02/20/2015    Pneumococcal Polysaccharide - 23 Valent 07/16/2015, 01/20/2022    Td - PF (ADULT) 12/11/1997, 09/13/2004    Tdap 06/11/2020    Varicella 09/13/2004, 05/17/2005        Review of Systems   Constitutional:  Negative for chills, diaphoresis, fever and malaise/fatigue.   HENT:  Negative for congestion, ear pain, hearing loss and sore throat.    Eyes:  Negative for blurred vision, photophobia and pain.   Respiratory:  Negative for cough, hemoptysis, sputum production and shortness of breath.    Cardiovascular:  Negative for chest pain, palpitations and leg swelling.   Gastrointestinal:  Negative for abdominal pain, constipation, diarrhea, nausea and vomiting.   Genitourinary:  Negative for dysuria, flank pain, frequency, hematuria and urgency.   Musculoskeletal:  Negative for back pain, joint pain, myalgias and neck pain.   Skin:  Negative for itching and rash.   Neurological:  Negative for dizziness, weakness and headaches.   Endo/Heme/Allergies:  Does not bruise/bleed easily.   Psychiatric/Behavioral:  Negative for depression and hallucinations.           Objective:      /73 (BP Location: Other (Comment), Patient Position: Sitting)   Pulse 76   Temp 98.1  "°F (36.7 °C) (Oral)   Resp 16   Ht 5' 10" (1.778 m)   Wt 89.4 kg (197 lb 1.5 oz)   BMI 28.28 kg/m²      Physical Exam  Vitals reviewed.   Constitutional:       General: He is awake. He is not in acute distress.     Appearance: Normal appearance. He is normal weight. He is not ill-appearing, toxic-appearing or diaphoretic.   HENT:      Head: Normocephalic and atraumatic.      Nose: Nose normal.      Mouth/Throat:      Mouth: Mucous membranes are moist.      Pharynx: Oropharynx is clear. No oropharyngeal exudate or posterior oropharyngeal erythema.      Comments: No thrush  Eyes:      General: No scleral icterus.     Conjunctiva/sclera: Conjunctivae normal.      Pupils: Pupils are equal, round, and reactive to light.   Neck:      Comments: No JVD noted  Cardiovascular:      Rate and Rhythm: Normal rate and regular rhythm.      Heart sounds: Normal heart sounds. No murmur heard.     No friction rub. No gallop.   Pulmonary:      Effort: Pulmonary effort is normal. No respiratory distress.      Breath sounds: Normal breath sounds. No wheezing.   Abdominal:      General: Bowel sounds are normal. There is no distension.      Palpations: Abdomen is soft.      Tenderness: There is no abdominal tenderness. There is no guarding.   Musculoskeletal:         General: No swelling or deformity. Normal range of motion.      Cervical back: Normal range of motion and neck supple.      Right lower leg: No edema.      Left lower leg: No edema.   Skin:     General: Skin is warm and dry.      Capillary Refill: Capillary refill takes less than 2 seconds.      Coloration: Skin is not jaundiced.      Findings: No rash.   Neurological:      General: No focal deficit present.      Mental Status: He is alert and oriented to person, place, and time. Mental status is at baseline.   Psychiatric:         Mood and Affect: Mood normal.         Behavior: Behavior normal.         Thought Content: Thought content normal.         Judgment: Judgment " normal.          Labs:   Lab Results   Component Value Date    WBC 5.34 03/06/2025    HGB 14.0 03/06/2025    HCT 41.6 (L) 03/06/2025    MCV 89.5 03/06/2025     03/06/2025       CMP  Sodium   Date Value Ref Range Status   03/06/2025 139 136 - 145 mmol/L Final     Potassium   Date Value Ref Range Status   03/06/2025 3.8 3.5 - 5.1 mmol/L Final     Chloride   Date Value Ref Range Status   03/06/2025 104 98 - 107 mmol/L Final     CO2   Date Value Ref Range Status   03/06/2025 28 22 - 29 mmol/L Final     Glucose   Date Value Ref Range Status   03/06/2025 95 74 - 100 mg/dL Final     Blood Urea Nitrogen   Date Value Ref Range Status   03/06/2025 17.5 8.9 - 20.6 mg/dL Final     Creatinine   Date Value Ref Range Status   03/06/2025 0.76 0.72 - 1.25 mg/dL Final     Calcium   Date Value Ref Range Status   03/06/2025 9.7 8.4 - 10.2 mg/dL Final     Protein Total   Date Value Ref Range Status   03/06/2025 7.9 6.4 - 8.3 gm/dL Final     Albumin   Date Value Ref Range Status   03/06/2025 4.2 3.5 - 5.0 g/dL Final     Bilirubin Total   Date Value Ref Range Status   03/06/2025 0.6 <=1.5 mg/dL Final     ALP   Date Value Ref Range Status   03/06/2025 37 (L) 40 - 150 unit/L Final     AST   Date Value Ref Range Status   03/06/2025 14 5 - 34 unit/L Final     ALT   Date Value Ref Range Status   03/06/2025 14 0 - 55 unit/L Final     eGFR   Date Value Ref Range Status   03/06/2025 >60 mL/min/1.73/m2 Final     Comment:     Estimated GFR calculated using the CKD-EPI creatinine (2021) equation.     Lab Results   Component Value Date    TSH 0.795 07/24/2024     Hep C Ab Interp   Date Value Ref Range Status   04/23/2024 Nonreactive Nonreactive Final     Syphilis Antibody   Date Value Ref Range Status   04/23/2024 Nonreactive Nonreactive, Equivocal Final     RPR   Date Value Ref Range Status   04/26/2017 Non-Reactive >Non-Reactive Final     Cholesterol Total   Date Value Ref Range Status   07/24/2024 167 <=200 mg/dL Final     HDL Cholesterol    Date Value Ref Range Status   07/24/2024 72 (H) 35 - 60 mg/dL Final     Triglyceride   Date Value Ref Range Status   07/24/2024 71 34 - 140 mg/dL Final     Cholesterol/HDL Ratio   Date Value Ref Range Status   07/24/2024 2 0 - 5 Final     Very Low Density Lipoprotein   Date Value Ref Range Status   07/24/2024 14  Final     LDL Cholesterol   Date Value Ref Range Status   07/24/2024 81.00 50.00 - 140.00 mg/dL Final     Vitamin D   Date Value Ref Range Status   03/06/2025 41 30 - 80 ng/mL Final     Results for orders placed or performed in visit on 01/16/25   CD4 Lymphocytes   Result Value Ref Range    Patient Age 39     WBC Absolute 5,340 4,500 - 11,500 /mm3    Lymph Percent 43 28 - 48 %    Lymph Absolute 2,296.2 1,260 - 5,520 x10(3)/mcL    CD4 % 36.1 %    CD4 Absolute 829 589 - 1,505 unit/L    T Cell Interp       Normal absolute lymphocyte count with normal absolute CD4+ lymphocyte count.    Roger Briones M.D.     Narrative    This test was developed and its performance characteristics determined by [facility]. It has not been cleared or approved by the US Food and Drug Administration. The FDA does not require this test to go through premarket FDA review. This test is used for clinical purposes. It should not be regarded as investigational or for research. This laboratory is certified under the Clinical Laboratory Improvement Amendments (CLIA) as qualified to perform high complexity clinical laboratory testing.     Results for orders placed or performed in visit on 01/16/25   HIV-1 RNA, Quantitative, PCR with Reflex to Genotype   Result Value Ref Range    HIV-1 RNA Detect/Quant, P 30 (A) Undetected copies/mL     Results for orders placed or performed in visit on 03/06/25   Quantiferon Gold TB   Result Value Ref Range    QuantiFERON-Tb Gold Plus Result Negative Negative    TB1 Ag minus Nil Result 0.01 IU/mL    TB2 Ag minus Nil Result 0.00 IU/mL    Mitogen minus Nil Result 9.97  IU/mL    Nil Result 0.03 IU/mL     Results for orders placed or performed in visit on 07/24/24   C.trach/N.gonor AMP RNA    Specimen: Throat   Result Value Ref Range    Source THROAT     Chlamydia trachomatis amplified RNA Negative Negative    Neisseria gonorrhoeae amplified RNA Negative Negative    SOURCE: THROAT      Results for orders placed or performed in visit on 04/23/24   Urinalysis   Result Value Ref Range    Color, UA Colorless (A) Yellow, Light-Yellow, Dark Yellow, Eva, Straw    Appearance, UA Clear Clear    Specific Gravity, UA 1.019 1.005 - 1.030    pH, UA 6.5 5.0 - 8.5    Protein, UA Negative Negative    Glucose, UA Normal Negative, Normal    Ketones, UA Negative Negative    Blood, UA Negative Negative    Bilirubin, UA Negative Negative    Urobilinogen, UA Normal 0.2, 1.0, Normal    Nitrites, UA Negative Negative    Leukocyte Esterase, UA Negative Negative    WBC, UA 0-5 None Seen, 0-2, 3-5, 0-5 /HPF    Bacteria, UA None Seen None Seen /HPF    Squamous Epithelial Cells, UA None Seen None Seen /HPF    Hyaline Casts, UA None Seen None Seen /lpf    RBC, UA 0-5 None Seen, 0-2, 3-5, 0-5 /HPF       Imaging: Reviewed most recent relevant imaging studies available, notable results highlighted in this note    Medications:     Current Outpatient Medications   Medication Instructions    zvdzqttah-yznhtkls-zupukgq ala (BIKTARVY) -25 mg (25 kg or greater) 1 tablet, Daily    CABENUVA 600 mg/3 mL- 900 mg/3 mL injection 6 mLs, Intramuscular, Every 8 weeks    cholecalciferol (vitamin D3) (VITAMIN D3) 4,000 Units, Oral, Daily, For low vitamin D level    hydrOXYzine pamoate (VISTARIL) 25 mg, Oral, 3 times daily       Assessment:       Problem List Items Addressed This Visit          Psychiatric    Alcohol use       ID    HIV disease - Primary    Relevant Orders    CBC Auto Differential    Comprehensive Metabolic Panel    HIV-1 RNA, Quantitative, PCR with Reflex to Genotype    CD4 Lymphocytes       Oncology     Cancer screening       Endocrine    Vitamin D deficiency    Relevant Medications    cholecalciferol, vitamin D3, (VITAMIN D3) 50 mcg (2,000 unit) Cap capsule    Other Relevant Orders    Vitamin D     Other Visit Diagnoses         Routine screening for STI (sexually transmitted infection)        Relevant Orders    Chlamydia/GC, PCR    C.trach/N.gonor AMP RNA    Hepatitis B Surface Antigen    Hepatitis C Antibody    SYPHILIS ANTIBODY (WITH REFLEX RPR)    Chlamydia/GC, PCR      Need for vaccination                   Plan:      HIV disease  -     CBC Auto Differential; Future; Expected date: 07/02/2025  -     Comprehensive Metabolic Panel; Future; Expected date: 07/02/2025  -     HIV-1 RNA, Quantitative, PCR with Reflex to Genotype; Future; Expected date: 07/02/2025  -     CD4 Lymphocytes; Future; Expected date: 07/02/2025   Last CD4 829 (36.1%) with viral suppression  Continue Cabenuva injections every 2 months.  Injection due today   Patient met with pharmacist for counseling today  Discussed HIV status at length to include need for 100% medication compliance and adherence, along with safe sex counseling performed.  Patient voiced understanding to both.  Will check labs today to include CD4, viral load, CBC and CMP.  Discussed importance of scheduled follow up as well.   RTC in 4 months with Marta LARA or Dr Hankins  Schedule for shot clinic in 2 months for Cabenuva injection     Routine screening for STI (sexually transmitted infection)  -     Chlamydia/GC, PCR  -     C.trach/N.gonor AMP RNA  -     Hepatitis B Surface Antigen; Future; Expected date: 07/02/2025  -     Hepatitis C Antibody; Future; Expected date: 07/02/2025  -     SYPHILIS ANTIBODY (WITH REFLEX RPR); Future; Expected date: 07/02/2025  -     Chlamydia/GC, PCR  Due for GC/CT screening (urine / oral / rectal), along with screening for syphilis and hepatitis    Cancer screening   Last anal pap with insufficient quantity    Patient request to defer anal Pap  repeat until next visit    Vitamin D deficiency  -     cholecalciferol, vitamin D3, (VITAMIN D3) 50 mcg (2,000 unit) Cap capsule; Take 2 capsules (4,000 Units total) by mouth once daily. For low vitamin D level  Dispense: 60 capsule; Refill: 0  -     Vitamin D; Future; Expected date: 07/02/2025  Last vitamin-D level 41   Continue cholecalciferol    Alcohol use   Alcohol cessation encouraged     Need for vaccination   Needs Menveo booster.  Patient requests to defer to next visit         АННА Humphreys  [facility] Infectious Diseases     53 minutes of total time spent on the encounter, which includes face to face time and non-face to face time preparing to see the patient (eg, review of tests), Obtaining and/or reviewing separately obtained history, Documenting clinical information in the electronic or other health record, Independently interpreting results (not separately reported) and communicating results to the patient/family/caregiver, or Care coordination (not separately reported).

## 2025-07-03 LAB
AGE: 37
C TRACH RRNA SPEC QL NAA+PROBE: NEGATIVE
CD3+CD4+ CELLS # SPEC: 869 UNIT/L (ref 589–1505)
CD3+CD4+ CELLS NFR BLD: 36.9 %
LYMPHOCYTES # BLD AUTO: 2354.4 X10(3)/MCL (ref 1260–5520)
LYMPHOCYTES NFR LN MANUAL: 36 % (ref 28–48)
LYMPHOMA - T-CELL MARKERS SPEC-IMP: NORMAL
N GONORRHOEA RRNA SPEC QL NAA+PROBE: NEGATIVE
SPECIMEN SOURCE: NORMAL
SPECIMEN SOURCE: NORMAL
WBC # BLD AUTO: 6540 /MM3 (ref 4500–11500)

## 2025-07-05 LAB — HIV1 RNA # PLAS NAA DL=20: <20 COPIES/ML

## 2025-07-25 ENCOUNTER — TELEPHONE (OUTPATIENT)
Dept: FAMILY MEDICINE | Facility: CLINIC | Age: 40
End: 2025-07-25
Payer: MEDICAID

## 2025-08-07 ENCOUNTER — TELEPHONE (OUTPATIENT)
Dept: FAMILY MEDICINE | Facility: CLINIC | Age: 40
End: 2025-08-07
Payer: MEDICAID

## 2025-08-12 ENCOUNTER — OFFICE VISIT (OUTPATIENT)
Dept: FAMILY MEDICINE | Facility: CLINIC | Age: 40
End: 2025-08-12
Payer: MEDICAID

## 2025-08-12 VITALS
RESPIRATION RATE: 18 BRPM | HEIGHT: 70 IN | BODY MASS INDEX: 28.61 KG/M2 | DIASTOLIC BLOOD PRESSURE: 84 MMHG | OXYGEN SATURATION: 100 % | TEMPERATURE: 98 F | HEART RATE: 70 BPM | WEIGHT: 199.88 LBS | SYSTOLIC BLOOD PRESSURE: 131 MMHG

## 2025-08-12 DIAGNOSIS — G47.00 INSOMNIA, UNSPECIFIED TYPE: ICD-10-CM

## 2025-08-12 DIAGNOSIS — B20 HIV DISEASE: ICD-10-CM

## 2025-08-12 DIAGNOSIS — Z00.00 WELL ADULT EXAM: Primary | ICD-10-CM

## 2025-08-12 DIAGNOSIS — D49.2 SKIN GROWTH: ICD-10-CM

## 2025-08-12 LAB
BACTERIA #/AREA URNS AUTO: ABNORMAL /HPF
BILIRUB UR QL STRIP.AUTO: NEGATIVE
CHOLEST SERPL-MCNC: 154 MG/DL
CHOLEST/HDLC SERPL: 2 {RATIO} (ref 0–5)
CLARITY UR: CLEAR
COLOR UR AUTO: ABNORMAL
EST. AVERAGE GLUCOSE BLD GHB EST-MCNC: 111.2 MG/DL
GLUCOSE UR QL STRIP: NORMAL
HBA1C MFR BLD: 5.5 %
HDLC SERPL-MCNC: 72 MG/DL (ref 35–60)
HGB UR QL STRIP: ABNORMAL
HYALINE CASTS #/AREA URNS LPF: ABNORMAL /LPF
KETONES UR QL STRIP: NEGATIVE
LDLC SERPL CALC-MCNC: 71 MG/DL (ref 50–140)
LEUKOCYTE ESTERASE UR QL STRIP: NEGATIVE
MUCOUS THREADS URNS QL MICRO: ABNORMAL /LPF
NITRITE UR QL STRIP: NEGATIVE
PH UR STRIP: 5.5 [PH]
PROT UR QL STRIP: NEGATIVE
RBC #/AREA URNS AUTO: ABNORMAL /HPF
SP GR UR STRIP.AUTO: 1.02 (ref 1–1.03)
SQUAMOUS #/AREA URNS LPF: ABNORMAL /HPF
TRIGL SERPL-MCNC: 55 MG/DL (ref 34–140)
TSH SERPL-ACNC: 1.5 UIU/ML (ref 0.35–4.94)
UROBILINOGEN UR STRIP-ACNC: NORMAL
VLDLC SERPL CALC-MCNC: 11 MG/DL
WBC #/AREA URNS AUTO: ABNORMAL /HPF

## 2025-08-12 PROCEDURE — 83036 HEMOGLOBIN GLYCOSYLATED A1C: CPT | Performed by: NURSE PRACTITIONER

## 2025-08-12 PROCEDURE — 1159F MED LIST DOCD IN RCRD: CPT | Mod: CPTII,,, | Performed by: NURSE PRACTITIONER

## 2025-08-12 PROCEDURE — 80061 LIPID PANEL: CPT | Performed by: NURSE PRACTITIONER

## 2025-08-12 PROCEDURE — 99214 OFFICE O/P EST MOD 30 MIN: CPT | Mod: PBBFAC,PN | Performed by: NURSE PRACTITIONER

## 2025-08-12 PROCEDURE — 3008F BODY MASS INDEX DOCD: CPT | Mod: CPTII,,, | Performed by: NURSE PRACTITIONER

## 2025-08-12 PROCEDURE — 84443 ASSAY THYROID STIM HORMONE: CPT | Performed by: NURSE PRACTITIONER

## 2025-08-12 PROCEDURE — 1160F RVW MEDS BY RX/DR IN RCRD: CPT | Mod: CPTII,,, | Performed by: NURSE PRACTITIONER

## 2025-08-12 PROCEDURE — 99395 PREV VISIT EST AGE 18-39: CPT | Mod: S$PBB,,, | Performed by: NURSE PRACTITIONER

## 2025-08-12 PROCEDURE — 3075F SYST BP GE 130 - 139MM HG: CPT | Mod: CPTII,,, | Performed by: NURSE PRACTITIONER

## 2025-08-12 PROCEDURE — 81015 MICROSCOPIC EXAM OF URINE: CPT | Performed by: NURSE PRACTITIONER

## 2025-08-12 PROCEDURE — 3079F DIAST BP 80-89 MM HG: CPT | Mod: CPTII,,, | Performed by: NURSE PRACTITIONER

## 2025-09-02 ENCOUNTER — OFFICE VISIT (OUTPATIENT)
Dept: INFECTIOUS DISEASES | Facility: CLINIC | Age: 40
End: 2025-09-02
Payer: MEDICAID

## 2025-09-02 VITALS
HEART RATE: 69 BPM | BODY MASS INDEX: 28.62 KG/M2 | TEMPERATURE: 98 F | SYSTOLIC BLOOD PRESSURE: 118 MMHG | RESPIRATION RATE: 14 BRPM | HEIGHT: 70 IN | WEIGHT: 199.94 LBS | DIASTOLIC BLOOD PRESSURE: 71 MMHG

## 2025-09-02 DIAGNOSIS — Z11.3 ROUTINE SCREENING FOR STI (SEXUALLY TRANSMITTED INFECTION): ICD-10-CM

## 2025-09-02 DIAGNOSIS — A63.0 ANAL WART: ICD-10-CM

## 2025-09-02 DIAGNOSIS — B20 HIV DISEASE: Primary | ICD-10-CM

## 2025-09-02 LAB
C TRACH DNA SPEC QL NAA+PROBE: NOT DETECTED
C TRACH DNA SPEC QL NAA+PROBE: NOT DETECTED
N GONORRHOEA DNA SPEC QL NAA+PROBE: NOT DETECTED
N GONORRHOEA DNA SPEC QL NAA+PROBE: NOT DETECTED
SPECIMEN SOURCE: NORMAL
SPECIMEN SOURCE: NORMAL

## 2025-09-02 PROCEDURE — 87591 N.GONORRHOEAE DNA AMP PROB: CPT

## 2025-09-02 PROCEDURE — 87491 CHLMYD TRACH DNA AMP PROBE: CPT

## 2025-09-02 PROCEDURE — 99213 OFFICE O/P EST LOW 20 MIN: CPT | Mod: PBBFAC

## 2025-09-02 PROCEDURE — 88112 CYTOPATH CELL ENHANCE TECH: CPT

## 2025-09-02 PROCEDURE — 87529 HSV DNA AMP PROBE: CPT

## 2025-09-02 PROCEDURE — 96372 THER/PROPH/DIAG INJ SC/IM: CPT | Mod: PBBFAC

## 2025-09-02 RX ADMIN — CABOTEGRAVIR AND RILPIVIRINE 6 ML: KIT at 10:09

## 2025-09-03 ENCOUNTER — TELEPHONE (OUTPATIENT)
Dept: INFECTIOUS DISEASES | Facility: HOSPITAL | Age: 40
End: 2025-09-03
Payer: MEDICAID

## 2025-09-03 LAB — PSYCHE PATHOLOGY RESULT: NORMAL

## 2025-09-04 LAB
HSV1 DNA SPEC QL NAA+PROBE: NEGATIVE
HSV2 DNA SPEC QL NAA+PROBE: NEGATIVE
SPECIMEN SOURCE: NORMAL